# Patient Record
Sex: FEMALE | Race: ASIAN | NOT HISPANIC OR LATINO
[De-identification: names, ages, dates, MRNs, and addresses within clinical notes are randomized per-mention and may not be internally consistent; named-entity substitution may affect disease eponyms.]

---

## 2025-01-17 PROBLEM — Z00.00 ENCOUNTER FOR PREVENTIVE HEALTH EXAMINATION: Status: ACTIVE | Noted: 2025-01-17

## 2025-01-24 ENCOUNTER — APPOINTMENT (OUTPATIENT)
Dept: COLORECTAL SURGERY | Facility: CLINIC | Age: 60
End: 2025-01-24
Payer: COMMERCIAL

## 2025-01-24 ENCOUNTER — NON-APPOINTMENT (OUTPATIENT)
Age: 60
End: 2025-01-24

## 2025-01-24 VITALS
SYSTOLIC BLOOD PRESSURE: 143 MMHG | TEMPERATURE: 97.9 F | DIASTOLIC BLOOD PRESSURE: 82 MMHG | HEIGHT: 64 IN | BODY MASS INDEX: 19.63 KG/M2 | WEIGHT: 115 LBS | HEART RATE: 73 BPM

## 2025-01-24 DIAGNOSIS — K63.89 OTHER SPECIFIED DISEASES OF INTESTINE: ICD-10-CM

## 2025-01-24 DIAGNOSIS — Z86.39 PERSONAL HISTORY OF OTHER ENDOCRINE, NUTRITIONAL AND METABOLIC DISEASE: ICD-10-CM

## 2025-01-24 DIAGNOSIS — K86.89 OTHER SPECIFIED DISEASES OF PANCREAS: ICD-10-CM

## 2025-01-24 PROCEDURE — 99204 OFFICE O/P NEW MOD 45 MIN: CPT

## 2025-02-19 ENCOUNTER — APPOINTMENT (OUTPATIENT)
Dept: COLORECTAL SURGERY | Facility: CLINIC | Age: 60
End: 2025-02-19
Payer: COMMERCIAL

## 2025-02-19 VITALS
HEART RATE: 73 BPM | DIASTOLIC BLOOD PRESSURE: 89 MMHG | TEMPERATURE: 97.52 F | SYSTOLIC BLOOD PRESSURE: 156 MMHG | HEIGHT: 64 IN | WEIGHT: 115 LBS | BODY MASS INDEX: 19.63 KG/M2

## 2025-02-19 DIAGNOSIS — K63.89 OTHER SPECIFIED DISEASES OF INTESTINE: ICD-10-CM

## 2025-02-19 PROCEDURE — 99215 OFFICE O/P EST HI 40 MIN: CPT

## 2025-02-19 RX ORDER — METRONIDAZOLE 500 MG/1
500 TABLET ORAL
Qty: 6 | Refills: 0 | Status: ACTIVE | COMMUNITY
Start: 2025-02-19 | End: 1900-01-01

## 2025-02-19 RX ORDER — POLYETHYLENE GLYCOL 3350 17 G/17G
17 POWDER, FOR SOLUTION ORAL
Qty: 2 | Refills: 0 | Status: ACTIVE | COMMUNITY
Start: 2025-02-19 | End: 1900-01-01

## 2025-02-19 RX ORDER — CIPROFLOXACIN HYDROCHLORIDE 500 MG/1
500 TABLET, FILM COATED ORAL
Qty: 2 | Refills: 0 | Status: ACTIVE | COMMUNITY
Start: 2025-02-19 | End: 1900-01-01

## 2025-02-24 ENCOUNTER — APPOINTMENT (OUTPATIENT)
Dept: SURGICAL ONCOLOGY | Facility: CLINIC | Age: 60
End: 2025-02-24
Payer: COMMERCIAL

## 2025-02-24 VITALS
RESPIRATION RATE: 16 BRPM | SYSTOLIC BLOOD PRESSURE: 124 MMHG | DIASTOLIC BLOOD PRESSURE: 72 MMHG | TEMPERATURE: 209.66 F | BODY MASS INDEX: 19.29 KG/M2 | HEART RATE: 82 BPM | HEIGHT: 64 IN | OXYGEN SATURATION: 97 % | WEIGHT: 113 LBS

## 2025-02-24 DIAGNOSIS — Z86.39 PERSONAL HISTORY OF OTHER ENDOCRINE, NUTRITIONAL AND METABOLIC DISEASE: ICD-10-CM

## 2025-02-24 DIAGNOSIS — Z01.818 ENCOUNTER FOR OTHER PREPROCEDURAL EXAMINATION: ICD-10-CM

## 2025-02-24 DIAGNOSIS — K86.89 OTHER SPECIFIED DISEASES OF PANCREAS: ICD-10-CM

## 2025-02-24 LAB
ANION GAP SERPL CALC-SCNC: 12 MMOL/L
APTT BLD: 30.6 SEC
BUN SERPL-MCNC: 17 MG/DL
CALCIUM SERPL-MCNC: 9.1 MG/DL
CHLORIDE SERPL-SCNC: 103 MMOL/L
CO2 SERPL-SCNC: 26 MMOL/L
CREAT SERPL-MCNC: 0.57 MG/DL
EGFR: 105 ML/MIN/1.73M2
GLUCOSE SERPL-MCNC: 131 MG/DL
INR PPP: 0.98 RATIO
POTASSIUM SERPL-SCNC: 3.6 MMOL/L
PT BLD: 11.6 SEC
SODIUM SERPL-SCNC: 141 MMOL/L

## 2025-02-24 PROCEDURE — 99204 OFFICE O/P NEW MOD 45 MIN: CPT

## 2025-02-25 LAB
ABORH: NORMAL
BASOPHILS # BLD AUTO: 0.02 K/UL
BASOPHILS NFR BLD AUTO: 0.4 %
EOSINOPHIL # BLD AUTO: 0.02 K/UL
EOSINOPHIL NFR BLD AUTO: 0.4 %
HCT VFR BLD CALC: 39.6 %
HGB BLD-MCNC: 11.5 G/DL
IMM GRANULOCYTES NFR BLD AUTO: 0 %
LYMPHOCYTES # BLD AUTO: 0.83 K/UL
LYMPHOCYTES NFR BLD AUTO: 18.5 %
MAN DIFF?: NORMAL
MCHC RBC-ENTMCNC: 22.7 PG
MCHC RBC-ENTMCNC: 29 G/DL
MCV RBC AUTO: 78.1 FL
MONOCYTES # BLD AUTO: 0.48 K/UL
MONOCYTES NFR BLD AUTO: 10.7 %
NEUTROPHILS # BLD AUTO: 3.14 K/UL
NEUTROPHILS NFR BLD AUTO: 70 %
PLATELET # BLD AUTO: 289 K/UL
RBC # BLD: 5.07 M/UL
RBC # FLD: 14.5 %
WBC # FLD AUTO: 4.49 K/UL

## 2025-02-27 ENCOUNTER — OUTPATIENT (OUTPATIENT)
Dept: OUTPATIENT SERVICES | Facility: HOSPITAL | Age: 60
LOS: 1 days | End: 2025-02-27
Payer: COMMERCIAL

## 2025-02-27 DIAGNOSIS — K63.89 OTHER SPECIFIED DISEASES OF INTESTINE: ICD-10-CM

## 2025-02-27 PROCEDURE — 88342 IMHCHEM/IMCYTCHM 1ST ANTB: CPT | Mod: 26,59

## 2025-02-27 PROCEDURE — 88321 CONSLTJ&REPRT SLD PREP ELSWR: CPT

## 2025-02-27 PROCEDURE — 88342 IMHCHEM/IMCYTCHM 1ST ANTB: CPT

## 2025-02-27 PROCEDURE — 88341 IMHCHEM/IMCYTCHM EA ADD ANTB: CPT | Mod: 26

## 2025-02-27 PROCEDURE — 88341 IMHCHEM/IMCYTCHM EA ADD ANTB: CPT

## 2025-03-06 ENCOUNTER — OUTPATIENT (OUTPATIENT)
Dept: OUTPATIENT SERVICES | Facility: HOSPITAL | Age: 60
LOS: 1 days | End: 2025-03-06
Payer: COMMERCIAL

## 2025-03-06 ENCOUNTER — RESULT REVIEW (OUTPATIENT)
Age: 60
End: 2025-03-06

## 2025-03-06 DIAGNOSIS — C18.9 MALIGNANT NEOPLASM OF COLON, UNSPECIFIED: ICD-10-CM

## 2025-03-06 PROCEDURE — 88321 CONSLTJ&REPRT SLD PREP ELSWR: CPT

## 2025-03-10 ENCOUNTER — NON-APPOINTMENT (OUTPATIENT)
Age: 60
End: 2025-03-10

## 2025-03-10 ENCOUNTER — OUTPATIENT (OUTPATIENT)
Dept: OUTPATIENT SERVICES | Facility: HOSPITAL | Age: 60
LOS: 1 days | End: 2025-03-10
Payer: COMMERCIAL

## 2025-03-10 ENCOUNTER — APPOINTMENT (OUTPATIENT)
Dept: NUCLEAR MEDICINE | Facility: HOSPITAL | Age: 60
End: 2025-03-10

## 2025-03-10 ENCOUNTER — RESULT REVIEW (OUTPATIENT)
Age: 60
End: 2025-03-10

## 2025-03-10 PROCEDURE — 82962 GLUCOSE BLOOD TEST: CPT

## 2025-03-10 PROCEDURE — A9587: CPT

## 2025-03-10 PROCEDURE — 78816 PET IMAGE W/CT FULL BODY: CPT

## 2025-03-10 PROCEDURE — 78816 PET IMAGE W/CT FULL BODY: CPT | Mod: 26,PI

## 2025-03-25 VITALS
HEIGHT: 64 IN | RESPIRATION RATE: 17 BRPM | SYSTOLIC BLOOD PRESSURE: 152 MMHG | OXYGEN SATURATION: 98 % | WEIGHT: 110.67 LBS | DIASTOLIC BLOOD PRESSURE: 82 MMHG | HEART RATE: 91 BPM | TEMPERATURE: 98 F

## 2025-03-25 RX ORDER — ERGOCALCIFEROL 1.25 MG/1
1 CAPSULE ORAL
Refills: 0 | DISCHARGE

## 2025-03-25 NOTE — PATIENT PROFILE ADULT - FALL HARM RISK - HARM RISK INTERVENTIONS

## 2025-03-25 NOTE — PATIENT PROFILE ADULT - FUNCTIONAL ASSESSMENT - DAILY ACTIVITY 1.
Amanda Olivera from lab notified that blood cultures need to be drawn stat. 4 = No assist / stand by assistance

## 2025-03-26 ENCOUNTER — APPOINTMENT (OUTPATIENT)
Dept: COLORECTAL SURGERY | Facility: HOSPITAL | Age: 60
End: 2025-03-26

## 2025-03-26 ENCOUNTER — APPOINTMENT (OUTPATIENT)
Dept: SURGICAL ONCOLOGY | Facility: HOSPITAL | Age: 60
End: 2025-03-26

## 2025-03-26 ENCOUNTER — TRANSCRIPTION ENCOUNTER (OUTPATIENT)
Age: 60
End: 2025-03-26

## 2025-03-26 ENCOUNTER — INPATIENT (INPATIENT)
Facility: HOSPITAL | Age: 60
LOS: 11 days | Discharge: HOME HEALTH | End: 2025-04-07
Attending: SURGERY | Admitting: SURGERY
Payer: COMMERCIAL

## 2025-03-26 DIAGNOSIS — Z98.890 OTHER SPECIFIED POSTPROCEDURAL STATES: Chronic | ICD-10-CM

## 2025-03-26 DIAGNOSIS — Z88.0 ALLERGY STATUS TO PENICILLIN: ICD-10-CM

## 2025-03-26 DIAGNOSIS — C7B.8 OTHER SECONDARY NEUROENDOCRINE TUMORS: ICD-10-CM

## 2025-03-26 DIAGNOSIS — K80.20 CALCULUS OF GALLBLADDER WITHOUT CHOLECYSTITIS WITHOUT OBSTRUCTION: ICD-10-CM

## 2025-03-26 DIAGNOSIS — K63.0 ABSCESS OF INTESTINE: ICD-10-CM

## 2025-03-26 DIAGNOSIS — E83.39 OTHER DISORDERS OF PHOSPHORUS METABOLISM: ICD-10-CM

## 2025-03-26 DIAGNOSIS — I10 ESSENTIAL (PRIMARY) HYPERTENSION: ICD-10-CM

## 2025-03-26 DIAGNOSIS — K63.5 POLYP OF COLON: ICD-10-CM

## 2025-03-26 DIAGNOSIS — D3A.8 OTHER BENIGN NEUROENDOCRINE TUMORS: ICD-10-CM

## 2025-03-26 DIAGNOSIS — C7A.1 MALIGNANT POORLY DIFFERENTIATED NEUROENDOCRINE TUMORS: ICD-10-CM

## 2025-03-26 DIAGNOSIS — E44.1 MILD PROTEIN-CALORIE MALNUTRITION: ICD-10-CM

## 2025-03-26 LAB
ALBUMIN SERPL ELPH-MCNC: 3.3 G/DL — SIGNIFICANT CHANGE UP (ref 3.3–5)
ALP SERPL-CCNC: 68 U/L — SIGNIFICANT CHANGE UP (ref 40–120)
ALT FLD-CCNC: 31 U/L — SIGNIFICANT CHANGE UP (ref 10–45)
ANION GAP SERPL CALC-SCNC: 15 MMOL/L — SIGNIFICANT CHANGE UP (ref 5–17)
AST SERPL-CCNC: 84 U/L — HIGH (ref 10–40)
BILIRUB SERPL-MCNC: 1.3 MG/DL — HIGH (ref 0.2–1.2)
BLD GP AB SCN SERPL QL: NEGATIVE — SIGNIFICANT CHANGE UP
BUN SERPL-MCNC: 12 MG/DL — SIGNIFICANT CHANGE UP (ref 7–23)
CALCIUM SERPL-MCNC: 8.2 MG/DL — LOW (ref 8.4–10.5)
CHLORIDE SERPL-SCNC: 106 MMOL/L — SIGNIFICANT CHANGE UP (ref 96–108)
CO2 SERPL-SCNC: 20 MMOL/L — LOW (ref 22–31)
CREAT SERPL-MCNC: 0.78 MG/DL — SIGNIFICANT CHANGE UP (ref 0.5–1.3)
EGFR: 87 ML/MIN/1.73M2 — SIGNIFICANT CHANGE UP
EGFR: 87 ML/MIN/1.73M2 — SIGNIFICANT CHANGE UP
GLUCOSE SERPL-MCNC: 129 MG/DL — HIGH (ref 70–99)
HCT VFR BLD CALC: 34.9 % — SIGNIFICANT CHANGE UP (ref 34.5–45)
HGB BLD-MCNC: 10.7 G/DL — LOW (ref 11.5–15.5)
INR BLD: 1.25 — HIGH (ref 0.85–1.16)
MCHC RBC-ENTMCNC: 22.9 PG — LOW (ref 27–34)
MCHC RBC-ENTMCNC: 30.7 G/DL — LOW (ref 32–36)
MCV RBC AUTO: 74.7 FL — LOW (ref 80–100)
NRBC BLD AUTO-RTO: 0 /100 WBCS — SIGNIFICANT CHANGE UP (ref 0–0)
PLATELET # BLD AUTO: 216 K/UL — SIGNIFICANT CHANGE UP (ref 150–400)
POTASSIUM SERPL-MCNC: 3.2 MMOL/L — LOW (ref 3.5–5.3)
POTASSIUM SERPL-SCNC: 3.2 MMOL/L — LOW (ref 3.5–5.3)
PROT SERPL-MCNC: 5.6 G/DL — LOW (ref 6–8.3)
PROTHROM AB SERPL-ACNC: 14.6 SEC — HIGH (ref 9.9–13.4)
RBC # BLD: 4.67 M/UL — SIGNIFICANT CHANGE UP (ref 3.8–5.2)
RBC # FLD: 13.8 % — SIGNIFICANT CHANGE UP (ref 10.3–14.5)
RH IG SCN BLD-IMP: POSITIVE — SIGNIFICANT CHANGE UP
SODIUM SERPL-SCNC: 141 MMOL/L — SIGNIFICANT CHANGE UP (ref 135–145)
WBC # BLD: 12.6 K/UL — HIGH (ref 3.8–10.5)
WBC # FLD AUTO: 12.6 K/UL — HIGH (ref 3.8–10.5)

## 2025-03-26 PROCEDURE — 48153 PANCREATECTOMY: CPT

## 2025-03-26 PROCEDURE — 88309 TISSUE EXAM BY PATHOLOGIST: CPT | Mod: 26

## 2025-03-26 PROCEDURE — 88341 IMHCHEM/IMCYTCHM EA ADD ANTB: CPT | Mod: 26

## 2025-03-26 PROCEDURE — 88304 TISSUE EXAM BY PATHOLOGIST: CPT | Mod: 26

## 2025-03-26 PROCEDURE — 88342 IMHCHEM/IMCYTCHM 1ST ANTB: CPT | Mod: 26

## 2025-03-26 PROCEDURE — 88305 TISSUE EXAM BY PATHOLOGIST: CPT | Mod: 26

## 2025-03-26 PROCEDURE — 44160 REMOVAL OF COLON: CPT | Mod: GC

## 2025-03-26 PROCEDURE — 71045 X-RAY EXAM CHEST 1 VIEW: CPT | Mod: 26

## 2025-03-26 DEVICE — CLIP APPLIER ETHICON LIGACLIP 11.5" MEDIUM
Type: IMPLANTABLE DEVICE | Status: NON-FUNCTIONAL
Removed: 2025-03-26

## 2025-03-26 DEVICE — STAPLER COVIDIEN TRI-STAPLE 60MM PURPLE RELOAD
Type: IMPLANTABLE DEVICE | Status: NON-FUNCTIONAL
Removed: 2025-03-26

## 2025-03-26 DEVICE — LIGATING CLIPS WECK HORIZON MEDIUM (BLUE) 24
Type: IMPLANTABLE DEVICE | Status: NON-FUNCTIONAL
Removed: 2025-03-26

## 2025-03-26 DEVICE — SURGICEL FIBRILLAR 4 X 4"
Type: IMPLANTABLE DEVICE | Status: NON-FUNCTIONAL
Removed: 2025-03-26

## 2025-03-26 DEVICE — STAPLER ETHICON PROXIMATE 75MM WITH BLUE RELOAD
Type: IMPLANTABLE DEVICE | Status: NON-FUNCTIONAL
Removed: 2025-03-26

## 2025-03-26 DEVICE — SURGCEL 4 X 8"
Type: IMPLANTABLE DEVICE | Status: NON-FUNCTIONAL
Removed: 2025-03-26

## 2025-03-26 DEVICE — LIGATING CLIPS WECK HORIZON LARGE (ORANGE) 24
Type: IMPLANTABLE DEVICE | Status: NON-FUNCTIONAL
Removed: 2025-03-26

## 2025-03-26 RX ORDER — HEPARIN SODIUM 1000 [USP'U]/ML
5000 INJECTION INTRAVENOUS; SUBCUTANEOUS EVERY 8 HOURS
Refills: 0 | Status: DISCONTINUED | OUTPATIENT
Start: 2025-03-26 | End: 2025-04-07

## 2025-03-26 RX ORDER — BUPIVACAINE 13.3 MG/ML
20 INJECTION, SUSPENSION, LIPOSOMAL INFILTRATION ONCE
Refills: 0 | Status: DISCONTINUED | OUTPATIENT
Start: 2025-03-26 | End: 2025-03-27

## 2025-03-26 RX ORDER — NALOXONE HYDROCHLORIDE 0.4 MG/ML
0.1 INJECTION, SOLUTION INTRAMUSCULAR; INTRAVENOUS; SUBCUTANEOUS
Refills: 0 | Status: DISCONTINUED | OUTPATIENT
Start: 2025-03-26 | End: 2025-04-07

## 2025-03-26 RX ORDER — HEPARIN SODIUM 1000 [USP'U]/ML
5000 INJECTION INTRAVENOUS; SUBCUTANEOUS ONCE
Refills: 0 | Status: COMPLETED | OUTPATIENT
Start: 2025-03-26 | End: 2025-03-26

## 2025-03-26 RX ORDER — ONDANSETRON HCL/PF 4 MG/2 ML
4 VIAL (ML) INJECTION EVERY 6 HOURS
Refills: 0 | Status: DISCONTINUED | OUTPATIENT
Start: 2025-03-26 | End: 2025-04-07

## 2025-03-26 RX ORDER — SODIUM CHLORIDE 9 G/1000ML
1000 INJECTION, SOLUTION INTRAVENOUS ONCE
Refills: 0 | Status: COMPLETED | OUTPATIENT
Start: 2025-03-26 | End: 2025-03-26

## 2025-03-26 RX ORDER — HYDROMORPHONE/SOD CHLOR,ISO/PF 2 MG/10 ML
30 SYRINGE (ML) INJECTION
Refills: 0 | Status: DISCONTINUED | OUTPATIENT
Start: 2025-03-26 | End: 2025-03-31

## 2025-03-26 RX ORDER — SODIUM CHLORIDE 9 G/1000ML
1000 INJECTION, SOLUTION INTRAVENOUS
Refills: 0 | Status: DISCONTINUED | OUTPATIENT
Start: 2025-03-26 | End: 2025-03-28

## 2025-03-26 RX ORDER — CELECOXIB 50 MG/1
200 CAPSULE ORAL ONCE
Refills: 0 | Status: COMPLETED | OUTPATIENT
Start: 2025-03-26 | End: 2025-03-26

## 2025-03-26 RX ORDER — ACETAMINOPHEN 500 MG/5ML
1000 LIQUID (ML) ORAL EVERY 6 HOURS
Refills: 0 | Status: COMPLETED | OUTPATIENT
Start: 2025-03-26 | End: 2025-03-27

## 2025-03-26 RX ORDER — SCOPOLAMINE 1 MG/3D
1 PATCH, EXTENDED RELEASE TRANSDERMAL ONCE
Refills: 0 | Status: COMPLETED | OUTPATIENT
Start: 2025-03-26 | End: 2025-03-26

## 2025-03-26 RX ORDER — SODIUM CHLORIDE 9 G/1000ML
500 INJECTION, SOLUTION INTRAVENOUS ONCE
Refills: 0 | Status: DISCONTINUED | OUTPATIENT
Start: 2025-03-26 | End: 2025-03-26

## 2025-03-26 RX ADMIN — Medication 30 MILLILITER(S): at 17:36

## 2025-03-26 RX ADMIN — CELECOXIB 200 MILLIGRAM(S): 50 CAPSULE ORAL at 07:34

## 2025-03-26 RX ADMIN — Medication 100 MILLIEQUIVALENT(S): at 23:53

## 2025-03-26 RX ADMIN — Medication 400 MILLIGRAM(S): at 18:35

## 2025-03-26 RX ADMIN — Medication 100 MILLIEQUIVALENT(S): at 19:51

## 2025-03-26 RX ADMIN — SODIUM CHLORIDE 150 MILLILITER(S): 9 INJECTION, SOLUTION INTRAVENOUS at 17:10

## 2025-03-26 RX ADMIN — HEPARIN SODIUM 5000 UNIT(S): 1000 INJECTION INTRAVENOUS; SUBCUTANEOUS at 07:35

## 2025-03-26 RX ADMIN — SODIUM CHLORIDE 1000 MILLILITER(S): 9 INJECTION, SOLUTION INTRAVENOUS at 20:10

## 2025-03-26 RX ADMIN — Medication 100 MILLIEQUIVALENT(S): at 21:03

## 2025-03-26 RX ADMIN — Medication 100 MILLIEQUIVALENT(S): at 22:00

## 2025-03-26 RX ADMIN — HEPARIN SODIUM 5000 UNIT(S): 1000 INJECTION INTRAVENOUS; SUBCUTANEOUS at 23:11

## 2025-03-26 RX ADMIN — SCOPOLAMINE 1 PATCH: 1 PATCH, EXTENDED RELEASE TRANSDERMAL at 07:35

## 2025-03-26 RX ADMIN — SODIUM CHLORIDE 150 MILLILITER(S): 9 INJECTION, SOLUTION INTRAVENOUS at 23:50

## 2025-03-26 RX ADMIN — Medication 400 MILLIGRAM(S): at 23:56

## 2025-03-26 NOTE — BRIEF OPERATIVE NOTE - OPERATION/FINDINGS
Upper midline laparotomy. Peritoneal cavity entered and falciform ligament taken down with electrosurgery. Hepatic flexure mobilization completed in lateral to medial fashion. Lesser sac then entered outside epiploic arcade and extended laterally. Kocher maneuver then completed with exposure of the left renal vein. SMV identified and infrapancreatic dissection completed. Stay sutures placed across pancreatic neck at level of planned transection. Common hepatic artery lymph node then identified and sent as specimen. OLGA then traced to GDA which was ligated with 3.0 silk ties and clips after confirmed OLGA with palpable pulse. Mobilization of distal stomach completed and right gastric artery ligated with ties and harmonic. D1 segment of duodenum then divided with endogia stapler purple load for pylorus sparing procedure. Attention turned back to portal dissection. Open cholecystectomy peformed in fundus first fashion. Cystic artery and duct taken with ties and clips. Common bile duct divided sharply just distal to cystic duct. Jejunal dissection then completed and divided approximately 20cm distal to LoT with endogia stapler purple load. Mesentry taken with harmonic past LoT and jejunum lateralized. Pancreas then divided sharply with 10blade. Pancreatic duct identified and stented with 5Fr stent. Specimen then taken off SMV/SMA and removed. Right hemicolectomy then performed with lateral to medial dissection. Right branch of middle colic and ileocolic arteries ligated high with 2.0 vicryl sutures and ties. Transverse colon and TI transected with endogia stapler purple loads. Side to side anastomosis then completed with Endogia stapler purple load 75mm. Staple line examined with no evidence of bleeding. Common enterotomy closed with neda stapler blue load x1. PJ completed over 5Fr stent in 2 layers with 3.0 silk and 5.0 PDS sutures. HJ completed in single layer with 4.0 PDS sutures. No evidence of bile leak visualized after anastomosis. GJ then completed in 2 layers.

## 2025-03-26 NOTE — H&P ADULT - ASSESSMENT
58yo F with PMH of HTN and no significant PSx found to have a 3cm polypoid lesion of the appendiceal orifice on screening colonoscopy with 3cm pancreatic head cyst concerning for neuroendocrine tumor on f/u CT scan. Subsequent EGD/EUS confirmed neuroendocrine pathology.     Plan:  Proceed to OR

## 2025-03-26 NOTE — BRIEF OPERATIVE NOTE - NSICDXBRIEFPROCEDURE_GEN_ALL_CORE_FT
PROCEDURES:  Diagnostic laparoscopy with Whipple procedure 26-Mar-2025 16:35:53  Ranjith Oshea  Open right hemicolectomy 26-Mar-2025 16:36:17  Ranjith Oshea

## 2025-03-26 NOTE — PROGRESS NOTE ADULT - SUBJECTIVE AND OBJECTIVE BOX
Procedure: Open whipple, open hemicolectomy  Surgeon: Dr. Shipley    S: Pt resting comfortably in bed, son by bedside. Patient states she has some epigastric pain and a dry throat. Denies CP, SOB, SANCHEZ, calf tenderness, nausea, vomiting. Pain controlled with medication.    O:  T(C): --  T(F): --  HR: 87 (03-26-25 @ 17:15) (87 - 92)  BP: 145/79 (03-26-25 @ 17:15) (133/76 - 145/79)  RR: 15 (03-26-25 @ 17:15) (13 - 16)  SpO2: 100% (03-26-25 @ 17:15) (100% - 100%)  Wt(kg): --                        10.7   12.60 )-----------( 216      ( 26 Mar 2025 17:24 )             34.9     03-26    141  |  106  |  12  ----------------------------<  129[H]  3.2[L]   |  20[L]  |  0.78    Ca    8.2[L]      26 Mar 2025 17:24    TPro  5.6[L]  /  Alb  3.3  /  TBili  1.3[H]  /  DBili  x   /  AST  84[H]  /  ALT  31  /  AlkPhos  68  03-26      Gen: NAD, resting comfortably in bed  HEENT: NGT to LCWS with dark tinged, bilious output.  C/V: NSR  Pulm: Nonlabored breathing, no respiratory distress  Abd: soft, mild epigastric ttp, nondistended. SEAN x 2 with ss output. Midline dressing with minimal strikethrough.  : Subramanian Catheter in place with clear, yellow urine  Extrem: WWP, SCDs in place

## 2025-03-26 NOTE — PROGRESS NOTE ADULT - ASSESSMENT
58yo F with PMH of HTN and no significant PSx found to have a 3cm polypoid lesion of the appendiceal orifice on screening colonoscopy with 3cm pancreatic head cyst concerning for neuroendocrine tumor on f/u CT scan. Subsequent EGD/EUS confirmed neuroendocrine pathology. Now s/p open whipple and right hemicolectomy on 3/26.    Telemetry  Whipple Pathway  Dilaudid PCA/IV Tylenol/Zofran  NGT to LCWS  Subramanian  SEAN drain x2  Protonix 40mg IV qd  HSQ/SCDs/IS/OOBA  AM labs

## 2025-03-26 NOTE — PRE-ANESTHESIA EVALUATION ADULT - NSANTHTOTALSCORECAL_ENT_A_CORE
(+) large pericardial effusion with signs of early cardiac tamponade. S/P IR drainage of pericardial effusion. Improved based on TTE 5/12  s/p Bleomycin pericardial sclerosis 5/13, pt tolerated procedure well   Tele monitoring, reviewed   s/p removal of pericardial drain   -f/u with Cardiology / CTS as outpatient 2

## 2025-03-26 NOTE — H&P ADULT - HISTORY OF PRESENT ILLNESS
58yo F with PMH of HTN and no significant PSx found to have a 3cm polypoid lesion of the appendiceal orifice on screening colonoscopy with 3cm pancreatic head cyst concerning for neuroendocrine tumor on f/u CT scan. Subsequent EGD/EUS confirmed neuroendocrine pathology.     PMH: HTN  PSx: Excision of dermoid cyst    Meds: Drisol (vitamin D) 50,000U qweekly

## 2025-03-26 NOTE — PRE-ANESTHESIA EVALUATION ADULT - NSANTHPMHFT_GEN_ALL_CORE
58yo [place]ish[place] speaking F with hx of HTN, newly diagnosed mass in the head of pancreas presents for open Whipple procedure. She denies cardiac or pulmonary hx or symptoms, able to perform >4mets. She has only mild discomfort and some nausea this morning.

## 2025-03-27 LAB
ALBUMIN SERPL ELPH-MCNC: 3.1 G/DL — LOW (ref 3.3–5)
ALP SERPL-CCNC: 59 U/L — SIGNIFICANT CHANGE UP (ref 40–120)
ALT FLD-CCNC: 48 U/L — HIGH (ref 10–45)
ANION GAP SERPL CALC-SCNC: 8 MMOL/L — SIGNIFICANT CHANGE UP (ref 5–17)
APTT BLD: 32.8 SEC — SIGNIFICANT CHANGE UP (ref 24.5–35.6)
AST SERPL-CCNC: 72 U/L — HIGH (ref 10–40)
BILIRUB SERPL-MCNC: 0.5 MG/DL — SIGNIFICANT CHANGE UP (ref 0.2–1.2)
BUN SERPL-MCNC: 13 MG/DL — SIGNIFICANT CHANGE UP (ref 7–23)
CALCIUM SERPL-MCNC: 7.9 MG/DL — LOW (ref 8.4–10.5)
CHLORIDE SERPL-SCNC: 106 MMOL/L — SIGNIFICANT CHANGE UP (ref 96–108)
CO2 SERPL-SCNC: 26 MMOL/L — SIGNIFICANT CHANGE UP (ref 22–31)
CREAT SERPL-MCNC: 0.7 MG/DL — SIGNIFICANT CHANGE UP (ref 0.5–1.3)
EGFR: 100 ML/MIN/1.73M2 — SIGNIFICANT CHANGE UP
EGFR: 100 ML/MIN/1.73M2 — SIGNIFICANT CHANGE UP
GLUCOSE SERPL-MCNC: 114 MG/DL — HIGH (ref 70–99)
HCT VFR BLD CALC: 30.9 % — LOW (ref 34.5–45)
HGB BLD-MCNC: 9.4 G/DL — LOW (ref 11.5–15.5)
INR BLD: 1.39 — HIGH (ref 0.85–1.16)
MAGNESIUM SERPL-MCNC: 1.8 MG/DL — SIGNIFICANT CHANGE UP (ref 1.6–2.6)
MCHC RBC-ENTMCNC: 23.3 PG — LOW (ref 27–34)
MCHC RBC-ENTMCNC: 30.4 G/DL — LOW (ref 32–36)
MCV RBC AUTO: 76.7 FL — LOW (ref 80–100)
NRBC BLD AUTO-RTO: 0 /100 WBCS — SIGNIFICANT CHANGE UP (ref 0–0)
PHOSPHATE SERPL-MCNC: 3.9 MG/DL — SIGNIFICANT CHANGE UP (ref 2.5–4.5)
PLATELET # BLD AUTO: 193 K/UL — SIGNIFICANT CHANGE UP (ref 150–400)
POTASSIUM SERPL-MCNC: 3.7 MMOL/L — SIGNIFICANT CHANGE UP (ref 3.5–5.3)
POTASSIUM SERPL-SCNC: 3.7 MMOL/L — SIGNIFICANT CHANGE UP (ref 3.5–5.3)
PROT SERPL-MCNC: 5.1 G/DL — LOW (ref 6–8.3)
PROTHROM AB SERPL-ACNC: 16.2 SEC — HIGH (ref 9.9–13.4)
RBC # BLD: 4.03 M/UL — SIGNIFICANT CHANGE UP (ref 3.8–5.2)
RBC # FLD: 13.9 % — SIGNIFICANT CHANGE UP (ref 10.3–14.5)
SODIUM SERPL-SCNC: 140 MMOL/L — SIGNIFICANT CHANGE UP (ref 135–145)
WBC # BLD: 10.55 K/UL — HIGH (ref 3.8–10.5)
WBC # FLD AUTO: 10.55 K/UL — HIGH (ref 3.8–10.5)

## 2025-03-27 RX ORDER — KETOROLAC TROMETHAMINE 30 MG/ML
15 INJECTION, SOLUTION INTRAMUSCULAR; INTRAVENOUS EVERY 6 HOURS
Refills: 0 | Status: DISCONTINUED | OUTPATIENT
Start: 2025-03-27 | End: 2025-03-29

## 2025-03-27 RX ORDER — MAGNESIUM SULFATE 500 MG/ML
1 SYRINGE (ML) INJECTION ONCE
Refills: 0 | Status: COMPLETED | OUTPATIENT
Start: 2025-03-27 | End: 2025-03-27

## 2025-03-27 RX ADMIN — Medication 1000 MILLIGRAM(S): at 06:31

## 2025-03-27 RX ADMIN — Medication 100 GRAM(S): at 09:47

## 2025-03-27 RX ADMIN — Medication 1000 MILLIGRAM(S): at 12:45

## 2025-03-27 RX ADMIN — Medication 400 MILLIGRAM(S): at 05:44

## 2025-03-27 RX ADMIN — Medication 40 MILLIGRAM(S): at 12:01

## 2025-03-27 RX ADMIN — SCOPOLAMINE 1 PATCH: 1 PATCH, EXTENDED RELEASE TRANSDERMAL at 19:49

## 2025-03-27 RX ADMIN — Medication 400 MILLIGRAM(S): at 12:01

## 2025-03-27 RX ADMIN — HEPARIN SODIUM 5000 UNIT(S): 1000 INJECTION INTRAVENOUS; SUBCUTANEOUS at 06:45

## 2025-03-27 RX ADMIN — Medication 100 MILLIEQUIVALENT(S): at 10:58

## 2025-03-27 RX ADMIN — SCOPOLAMINE 1 PATCH: 1 PATCH, EXTENDED RELEASE TRANSDERMAL at 06:57

## 2025-03-27 RX ADMIN — HEPARIN SODIUM 5000 UNIT(S): 1000 INJECTION INTRAVENOUS; SUBCUTANEOUS at 15:45

## 2025-03-27 RX ADMIN — HEPARIN SODIUM 5000 UNIT(S): 1000 INJECTION INTRAVENOUS; SUBCUTANEOUS at 23:00

## 2025-03-27 RX ADMIN — Medication 100 MILLIEQUIVALENT(S): at 09:49

## 2025-03-27 RX ADMIN — Medication 100 MILLIEQUIVALENT(S): at 12:57

## 2025-03-27 RX ADMIN — Medication 1000 MILLIGRAM(S): at 01:17

## 2025-03-27 NOTE — DISCHARGE NOTE PROVIDER - NSDCFUADDAPPT_GEN_ALL_CORE_FT
Please schedule a follow-up appointment with Dr. Shipley within 1-2 weeks of discharge from the hospital. You may reach his office at [phone] at your earliest convenience.

## 2025-03-27 NOTE — DISCHARGE NOTE PROVIDER - NSDCCPTREATMENT_GEN_ALL_CORE_FT
PRINCIPAL PROCEDURE  Procedure: Diagnostic laparoscopy with Whipple procedure  Findings and Treatment:       SECONDARY PROCEDURE  Procedure: Open right hemicolectomy  Findings and Treatment:

## 2025-03-27 NOTE — PROGRESS NOTE ADULT - SUBJECTIVE AND OBJECTIVE BOX
INTERVAL HPI/OVERNIGHT EVENTS: uo: 100 cc in pacu, 5 cc at 7pm, 1L bolus given, uo: 285 cc at mn. NGT flushed with 40 cc, output at MN: 50, bilious. SEAN x 2 SS. -OOBC. IVF continued at 150 cc/hr. UO: 735 cc    STATUS POST: open whipple and right hemicolectomy    POST OPERATIVE DAY #: 1    SUBJECTIVE: Patient seen and examined at bedside with chief resident. Patient denies flatus or bowel movement. Patient reports some pain but controlled with medications. Patient denies nausea or vomiting.     MEDICATIONS  (STANDING):  acetaminophen   IVPB .. 1000 milliGRAM(s) IV Intermittent every 6 hours  BUpivacaine liposome 1.3% Injectable (no eMAR) 20 milliLiter(s) Local Injection once  heparin   Injectable 5000 Unit(s) SubCutaneous every 8 hours  HYDROmorphone PCA (1 mG/mL) 30 milliLiter(s) PCA Continuous PCA Continuous  lactated ringers. 1000 milliLiter(s) (150 mL/Hr) IV Continuous <Continuous>  pantoprazole  Injectable 40 milliGRAM(s) IV Push daily    MEDICATIONS  (PRN):  ketorolac   Injectable 15 milliGRAM(s) IV Push every 6 hours PRN Mild Pain (1 - 3)  naloxone Injectable 0.1 milliGRAM(s) IV Push every 3 minutes PRN For ANY of the following changes in patient status:  A. RR LESS THAN 10 breaths per minute, B. Oxygen saturation LESS THAN 90%, C. Sedation score of 6  ondansetron Injectable 4 milliGRAM(s) IV Push every 6 hours PRN Nausea      Vital Signs Last 24 Hrs  T(C): 36.9 (27 Mar 2025 04:51), Max: 37.6 (26 Mar 2025 23:20)  T(F): 98.4 (27 Mar 2025 04:51), Max: 99.7 (26 Mar 2025 23:20)  HR: 82 (27 Mar 2025 04:00) (82 - 95)  BP: 106/56 (27 Mar 2025 04:00) (106/56 - 152/82)  BP(mean): 75 (27 Mar 2025 04:00) (75 - 108)  RR: 16 (27 Mar 2025 04:00) (10 - 25)  SpO2: 97% (27 Mar 2025 04:00) (93% - 100%)    Parameters below as of 27 Mar 2025 04:00  Patient On (Oxygen Delivery Method): room air        PHYSICAL EXAM:      General: NAD, resting comfortably in bed    Respiratory: non labored breathing, no respiratory distress    Cardiovascular: NSR, RRR    Gastrointestinal: soft, nondistended, appropriately TTP at incisions. no rebound or guarding. midline dressing with minimal strikethrough. SEAN x2 with SS output. NGT in place to LIWS with bilious output.    Genitourinary: calderon in place draining clear yellow urine    Extremities: WWP, no edema                  I&O's Detail    26 Mar 2025 07:01  -  27 Mar 2025 07:00  --------------------------------------------------------  IN:    IV PiggyBack: 400 mL    Lactated Ringers: 2100 mL    Lactated Ringers Bolus: 1000 mL  Total IN: 3500 mL    OUT:    Bulb (mL): 155 mL    Bulb (mL): 95 mL    Indwelling Catheter - Urethral (mL): 835 mL    Nasogastric/Oral tube (mL): 50 mL    Oral Fluid: 0 mL  Total OUT: 1135 mL    Total NET: 2365 mL          LABS:                        10.7   12.60 )-----------( 216      ( 26 Mar 2025 17:24 )             34.9     03-26    141  |  106  |  12  ----------------------------<  129[H]  3.2[L]   |  20[L]  |  0.78    Ca    8.2[L]      26 Mar 2025 17:24    TPro  5.6[L]  /  Alb  3.3  /  TBili  1.3[H]  /  DBili  x   /  AST  84[H]  /  ALT  31  /  AlkPhos  68  03-26    PT/INR - ( 26 Mar 2025 17:24 )   PT: 14.6 sec;   INR: 1.25            Urinalysis Basic - ( 26 Mar 2025 17:24 )    Color: x / Appearance: x / SG: x / pH: x  Gluc: 129 mg/dL / Ketone: x  / Bili: x / Urobili: x   Blood: x / Protein: x / Nitrite: x   Leuk Esterase: x / RBC: x / WBC x   Sq Epi: x / Non Sq Epi: x / Bacteria: x        RADIOLOGY & ADDITIONAL STUDIES:

## 2025-03-27 NOTE — PROGRESS NOTE ADULT - SUBJECTIVE AND OBJECTIVE BOX
SUBJECTIVE:      MEDICATIONS  (STANDING):  acetaminophen   IVPB .. 1000 milliGRAM(s) IV Intermittent every 6 hours  BUpivacaine liposome 1.3% Injectable (no eMAR) 20 milliLiter(s) Local Injection once  heparin   Injectable 5000 Unit(s) SubCutaneous every 8 hours  HYDROmorphone PCA (1 mG/mL) 30 milliLiter(s) PCA Continuous PCA Continuous  lactated ringers. 1000 milliLiter(s) (150 mL/Hr) IV Continuous <Continuous>  pantoprazole  Injectable 40 milliGRAM(s) IV Push daily    MEDICATIONS  (PRN):  naloxone Injectable 0.1 milliGRAM(s) IV Push every 3 minutes PRN For ANY of the following changes in patient status:  A. RR LESS THAN 10 breaths per minute, B. Oxygen saturation LESS THAN 90%, C. Sedation score of 6  ondansetron Injectable 4 milliGRAM(s) IV Push every 6 hours PRN Nausea      Vital Signs Last 24 Hrs  T(C): 36.9 (27 Mar 2025 04:51), Max: 37.6 (26 Mar 2025 23:20)  T(F): 98.4 (27 Mar 2025 04:51), Max: 99.7 (26 Mar 2025 23:20)  HR: 82 (27 Mar 2025 04:00) (82 - 95)  BP: 106/56 (27 Mar 2025 04:00) (106/56 - 152/82)  BP(mean): 75 (27 Mar 2025 04:00) (75 - 108)  RR: 16 (27 Mar 2025 04:00) (10 - 25)  SpO2: 97% (27 Mar 2025 04:00) (93% - 100%)    Parameters below as of 27 Mar 2025 04:00  Patient On (Oxygen Delivery Method): room air        PHYSICAL EXAM:      Constitutional: A&Ox3    Breasts:    Respiratory: non labored breathing, no respiratory distress    Cardiovascular: NSR, RRR    Gastrointestinal:                 Incision:    Genitourinary:    Extremities: (-) edema                  I&O's Detail    26 Mar 2025 07:01  -  27 Mar 2025 06:16  --------------------------------------------------------  IN:    IV PiggyBack: 400 mL    Lactated Ringers: 2100 mL    Lactated Ringers Bolus: 1000 mL  Total IN: 3500 mL    OUT:    Bulb (mL): 155 mL    Bulb (mL): 95 mL    Indwelling Catheter - Urethral (mL): 835 mL    Nasogastric/Oral tube (mL): 50 mL    Oral Fluid: 0 mL  Total OUT: 1135 mL    Total NET: 2365 mL          LABS:                        10.7   12.60 )-----------( 216      ( 26 Mar 2025 17:24 )             34.9     03-26    141  |  106  |  12  ----------------------------<  129[H]  3.2[L]   |  20[L]  |  0.78    Ca    8.2[L]      26 Mar 2025 17:24    TPro  5.6[L]  /  Alb  3.3  /  TBili  1.3[H]  /  DBili  x   /  AST  84[H]  /  ALT  31  /  AlkPhos  68  03-26    PT/INR - ( 26 Mar 2025 17:24 )   PT: 14.6 sec;   INR: 1.25            Urinalysis Basic - ( 26 Mar 2025 17:24 )    Color: x / Appearance: x / SG: x / pH: x  Gluc: 129 mg/dL / Ketone: x  / Bili: x / Urobili: x   Blood: x / Protein: x / Nitrite: x   Leuk Esterase: x / RBC: x / WBC x   Sq Epi: x / Non Sq Epi: x / Bacteria: x        RADIOLOGY & ADDITIONAL STUDIES: SUBJECTIVE: Pt seen and examined at bedside this AM. She endorses mild abdominal pain, denies flatus or BM.       MEDICATIONS  (STANDING):  acetaminophen   IVPB .. 1000 milliGRAM(s) IV Intermittent every 6 hours  BUpivacaine liposome 1.3% Injectable (no eMAR) 20 milliLiter(s) Local Injection once  heparin   Injectable 5000 Unit(s) SubCutaneous every 8 hours  HYDROmorphone PCA (1 mG/mL) 30 milliLiter(s) PCA Continuous PCA Continuous  lactated ringers. 1000 milliLiter(s) (150 mL/Hr) IV Continuous <Continuous>  pantoprazole  Injectable 40 milliGRAM(s) IV Push daily    MEDICATIONS  (PRN):  naloxone Injectable 0.1 milliGRAM(s) IV Push every 3 minutes PRN For ANY of the following changes in patient status:  A. RR LESS THAN 10 breaths per minute, B. Oxygen saturation LESS THAN 90%, C. Sedation score of 6  ondansetron Injectable 4 milliGRAM(s) IV Push every 6 hours PRN Nausea      Vital Signs Last 24 Hrs  T(C): 36.9 (27 Mar 2025 04:51), Max: 37.6 (26 Mar 2025 23:20)  T(F): 98.4 (27 Mar 2025 04:51), Max: 99.7 (26 Mar 2025 23:20)  HR: 82 (27 Mar 2025 04:00) (82 - 95)  BP: 106/56 (27 Mar 2025 04:00) (106/56 - 152/82)  BP(mean): 75 (27 Mar 2025 04:00) (75 - 108)  RR: 16 (27 Mar 2025 04:00) (10 - 25)  SpO2: 97% (27 Mar 2025 04:00) (93% - 100%)    Parameters below as of 27 Mar 2025 04:00  Patient On (Oxygen Delivery Method): room air        PHYSICAL EXAM:  General: NAD, pt resting comfortably in bed  Pulm: No respiratory distress, nonlabored breathing, on room air  CVS: NSR, HDS    NGT: with scant, thin green fluid  Abd: Soft, appropriate talha incisional tenderness, ND      SEAN SS x2  Extremities: WWP, no edema                      I&O's Detail    26 Mar 2025 07:01  -  27 Mar 2025 06:16  --------------------------------------------------------  IN:    IV PiggyBack: 400 mL    Lactated Ringers: 2100 mL    Lactated Ringers Bolus: 1000 mL  Total IN: 3500 mL    OUT:    Bulb (mL): 155 mL    Bulb (mL): 95 mL    Indwelling Catheter - Urethral (mL): 835 mL    Nasogastric/Oral tube (mL): 50 mL    Oral Fluid: 0 mL  Total OUT: 1135 mL    Total NET: 2365 mL          LABS:                        10.7   12.60 )-----------( 216      ( 26 Mar 2025 17:24 )             34.9     03-26    141  |  106  |  12  ----------------------------<  129[H]  3.2[L]   |  20[L]  |  0.78    Ca    8.2[L]      26 Mar 2025 17:24    TPro  5.6[L]  /  Alb  3.3  /  TBili  1.3[H]  /  DBili  x   /  AST  84[H]  /  ALT  31  /  AlkPhos  68  03-26    PT/INR - ( 26 Mar 2025 17:24 )   PT: 14.6 sec;   INR: 1.25            Urinalysis Basic - ( 26 Mar 2025 17:24 )    Color: x / Appearance: x / SG: x / pH: x  Gluc: 129 mg/dL / Ketone: x  / Bili: x / Urobili: x   Blood: x / Protein: x / Nitrite: x   Leuk Esterase: x / RBC: x / WBC x   Sq Epi: x / Non Sq Epi: x / Bacteria: x        RADIOLOGY & ADDITIONAL STUDIES:

## 2025-03-27 NOTE — DISCHARGE NOTE PROVIDER - CARE PROVIDER_API CALL
Maury Shipley  Surgical Oncology  [address] [address]  Phone: [phone]  Fax: [phone]  Follow Up Time: 2 weeks   Maury Shipley  Surgical Oncology  [address] [address]  Phone: [phone]  Fax: [phone]  Follow Up Time: 2 weeks    Jamie Cisneros  Surgery  [address] [place] [address],  [address]  [place] [address]  Phone: [phone]  Fax: [phone]  Follow Up Time: 1 month

## 2025-03-27 NOTE — DISCHARGE NOTE PROVIDER - NSDCMRMEDTOKEN_GEN_ALL_CORE_FT
Drisdol 1.25 mg (50,000 intl units) oral capsule: 1 cap(s) orally once a week   amoxicillin-clavulanate 875 mg-125 mg oral tablet: 1 tab(s) orally every 12 hours  Colace 100 mg oral capsule: 1 cap(s) orally every 12 hours as needed for  constipation  Drisdol 1.25 mg (50,000 intl units) oral capsule: 1 cap(s) orally once a week  oxyCODONE 5 mg oral tablet: 1 tab(s) orally every 6 hours as needed for  severe pain MDD: 4   amoxicillin-clavulanate 875 mg-125 mg oral tablet: 1 tab(s) orally every 12 hours  Colace 100 mg oral capsule: 1 cap(s) orally every 12 hours as needed for  constipation  Creon 24,000 units oral delayed release capsule: 2 cap(s) orally 3 times a day (with meals)  Drisdol 1.25 mg (50,000 intl units) oral capsule: 1 cap(s) orally once a week  gabapentin 100 mg oral tablet: 1 tab(s) orally every 8 hours  ibuprofen 600 mg oral tablet: 1 tab(s) orally every 6 hours as needed for  mild pain  pantoprazole 40 mg oral delayed release tablet: 1 tab(s) orally once a day (at bedtime)  senna (sennosides) 12 mg oral tablet: 1 tab(s) orally once a day as needed for  constipation  traMADol 50 mg oral tablet: 1 tab(s) orally every 4 hours as needed for  moderate pain MDD: 6

## 2025-03-27 NOTE — PROGRESS NOTE ADULT - ASSESSMENT
60yo F with PMH of HTN and no significant PSx found to have a 3cm polypoid lesion of the appendiceal orifice on screening colonoscopy with 3cm pancreatic head cyst concerning for neuroendocrine tumor on f/u CT scan. Subsequent EGD/EUS confirmed neuroendocrine pathology. Now s/p open whipple and right hemicolectomy on 3/26.    Telemetry  Whipple Pathway  Dilaudid PCA/IV Tylenol/Zofran  NGT to LCWS  Subramanian  SEAN drain x2  Protonix 40mg IV qd  HSQ/SCDs/IS/OOBA  AM labs

## 2025-03-27 NOTE — DISCHARGE NOTE PROVIDER - NSDCFUADDINST_GEN_ALL_CORE_FT
Follow up with Dr. Shipley in 1-2 weeks. Call the office at the number below to schedule your appointment. You may shower; soap and water over incision sites. Do not scrub. Pat dry when done. No tub bathing or swimming until cleared. Keep incision sites out of the sun as scars will darken. Ambulate as tolerated, but no heavy lifting (>10lbs) or strenuous exercise. You may resume _______ diet. You should be urinating at least 3-4x per day. Call the office if you experience increasing abdominal pain, nausea, vomiting, or temperature >101 F.    General Discharge Instructions:  Please resume all regular home medications unless specifically advised not to take a particular medication. Also, please take any new medications as prescribed.  Please get plenty of rest, continue to ambulate several times per day, and drink adequate amounts of fluids. Avoid lifting weights greater than 5-10 lbs until you follow-up with your surgeon, who will instruct you further regarding activity restrictions.  Avoid driving or operating heavy machinery while taking pain medications.  Please follow-up with your surgeon and Primary Care Provider (PCP) as advised.  Incision Care:  *Please call your doctor or nurse practitioner if you have increased pain, swelling, redness, or drainage from the incision site.  *Avoid swimming and baths until your follow-up appointment.  *You may shower, and wash surgical incisions with a mild soap and warm water. Gently pat the area dry.  *If you have staples, they will be removed at your follow-up appointment.  *If you have steri-strips, they will fall off on their own. Please remove any remaining strips 7-10 days after surgery.    Warning Signs:  Please call your doctor or nurse practitioner if you experience the following:  *You experience new chest pain, pressure, squeezing or tightness.  *New or worsening cough, shortness of breath, or wheeze.  *If you are vomiting and cannot keep down fluids or your medications.  *You are getting dehydrated due to continued vomiting, diarrhea, or other reasons. Signs of dehydration include dry mouth, rapid heartbeat, or feeling dizzy or faint when standing.  *You see blood or dark/black material when you vomit or have a bowel movement.  *You experience burning when you urinate, have blood in your urine, or experience a discharge.  *Your pain is not improving within 8-12 hours or is not gone within 24 hours. Call or return immediately if your pain is getting worse, changes location, or moves to your chest or back.  *You have shaking chills, or fever greater than 101.5 degrees Fahrenheit or 38 degrees Celsius.  *Any change in your symptoms, or any new symptoms that concern you.   Follow up with Dr. Shipley in 1-2 weeks. Call the office at the number below to schedule your appointment. You may shower; soap and water over incision sites. Do not scrub. Pat dry when done. No tub bathing or swimming until cleared. Keep incision sites out of the sun as scars will darken. Ambulate as tolerated, but no heavy lifting (>10lbs) or strenuous exercise. You may resume _______ diet. You should be urinating at least 3-4x per day. Call the office if you experience increasing abdominal pain, nausea, vomiting, or temperature >101 F.    General Discharge Instructions:  Please resume all regular home medications unless specifically advised not to take a particular medication. Also, please take any new medications as prescribed.  Please get plenty of rest, continue to ambulate several times per day, and drink adequate amounts of fluids. Avoid lifting weights greater than 5-10 lbs until you follow-up with your surgeon, who will instruct you further regarding activity restrictions.  Avoid driving or operating heavy machinery while taking pain medications.  Please follow-up with your surgeon and Primary Care Provider (PCP) as advised.    Incision Care:  *Please call your doctor or nurse practitioner if you have increased pain, swelling, redness, or drainage from the incision site.  *Avoid swimming and baths until your follow-up appointment.  *You may shower, and wash surgical incisions with a mild soap and warm water. Gently pat the area dry.  *If you have staples, they will be removed at your follow-up appointment.  *If you have steri-strips, they will fall off on their own. Please remove any remaining strips 7-10 days after surgery.    Warning Signs:  Please call your doctor or nurse practitioner if you experience the following:  *You experience new chest pain, pressure, squeezing or tightness.  *New or worsening cough, shortness of breath, or wheeze.  *If you are vomiting and cannot keep down fluids or your medications.  *You are getting dehydrated due to continued vomiting, diarrhea, or other reasons. Signs of dehydration include dry mouth, rapid heartbeat, or feeling dizzy or faint when standing.  *You see blood or dark/black material when you vomit or have a bowel movement.  *You experience burning when you urinate, have blood in your urine, or experience a discharge.  *Your pain is not improving within 8-12 hours or is not gone within 24 hours. Call or return immediately if your pain is getting worse, changes location, or moves to your chest or back.  *You have shaking chills, or fever greater than 101.5 degrees Fahrenheit or 38 degrees Celsius.  *Any change in your symptoms, or any new symptoms that concern you.   Follow up with Dr. Shipley in 1-2 weeks. Call the office at the number below to schedule your appointment. You may shower; soap and water over incision sites. Do not scrub. Pat dry when done. No tub bathing or swimming until cleared. Keep incision sites out of the sun as scars will darken. Ambulate as tolerated, but no heavy lifting (>10lbs) or strenuous exercise. You may resume soft and bite sized diet. You should be urinating at least 3-4x per day. Call the office if you experience increasing abdominal pain, nausea, vomiting, or temperature >101 F.    General Discharge Instructions:  Please resume all regular home medications unless specifically advised not to take a particular medication. Also, please take any new medications as prescribed.  Please get plenty of rest, continue to ambulate several times per day, and drink adequate amounts of fluids. Avoid lifting weights greater than 5-10 lbs until you follow-up with your surgeon, who will instruct you further regarding activity restrictions.  Avoid driving or operating heavy machinery while taking pain medications.  Please follow-up with your surgeon and Primary Care Provider (PCP) as advised.    Incision Care:  *Please call your doctor or nurse practitioner if you have increased pain, swelling, redness, or drainage from the incision site.  *Avoid swimming and baths until your follow-up appointment.  *You may shower, and wash surgical incisions with a mild soap and warm water. Gently pat the area dry.  *If you have staples, they will be removed at your follow-up appointment.  *If you have steri-strips, they will fall off on their own. Please remove any remaining strips 7-10 days after surgery.    Warning Signs:  Please call your doctor or nurse practitioner if you experience the following:  *You experience new chest pain, pressure, squeezing or tightness.  *New or worsening cough, shortness of breath, or wheeze.  *If you are vomiting and cannot keep down fluids or your medications.  *You are getting dehydrated due to continued vomiting, diarrhea, or other reasons. Signs of dehydration include dry mouth, rapid heartbeat, or feeling dizzy or faint when standing.  *You see blood or dark/black material when you vomit or have a bowel movement.  *You experience burning when you urinate, have blood in your urine, or experience a discharge.  *Your pain is not improving within 8-12 hours or is not gone within 24 hours. Call or return immediately if your pain is getting worse, changes location, or moves to your chest or back.  *You have shaking chills, or fever greater than 101.5 degrees Fahrenheit or 38 degrees Celsius.  *Any change in your symptoms, or any new symptoms that concern you.    New medications:  - Ibuprofin 600mg every 6 hours, as needed for pain  - Tylenol 650mg every 6 hours, as needed for pain  - Gabapentin 100mg every 8 hours  - Tramadol 50mg every 4 hours, as needed for pain  - Colace 100mg every 12 hours as needed for constipation  - Pantoprazole 40mg once daily  - Creon (pancrelipase) 24K 2 capsules with meals, 1 capsule with snacks   - Antibiotic: Augmentin 875mg-125mg, every 12 hours, for 7 days

## 2025-03-27 NOTE — DISCHARGE NOTE PROVIDER - NSDCACTIVITY_GEN_ALL_CORE
Showering allowed/Stairs allowed/Walking - Indoors allowed/No heavy lifting/straining/Walking - Outdoors allowed/Activity as tolerated Showering allowed/Stairs allowed/Walking - Indoors allowed/No heavy lifting/straining/Walking - Outdoors allowed

## 2025-03-27 NOTE — DISCHARGE NOTE PROVIDER - PROVIDER TOKENS
PROVIDER:[TOKEN:[[id number]],FOLLOWUP:[2 weeks]] PROVIDER:[TOKEN:[[id number]],FOLLOWUP:[2 weeks]],PROVIDER:[TOKEN:[[id number]],FOLLOWUP:[1 month]]

## 2025-03-27 NOTE — DISCHARGE NOTE PROVIDER - CARE PROVIDERS DIRECT ADDRESSES
[email or web address]

## 2025-03-27 NOTE — DISCHARGE NOTE PROVIDER - HOSPITAL COURSE
58yo F with PMH of HTN and no significant PSx found to have a 3cm polypoid lesion of the appendiceal orifice on screening colonoscopy with 3cm pancreatic head cyst concerning for neuroendocrine tumor on f/u CT scan. Subsequent EGD/EUS confirmed neuroendocrine pathology. Now s/p open whipple and right hemicolectomy on 3/26. Patient's operation was uncomplicated and her POC was within normal limits. Her NGT was removed on POD 1 and calderon catheter was removed on ________. Patient ______ her TOV.    **INCOMPLETE 3/27** 60yo F with PMH of HTN and no significant PSx found to have a 3cm polypoid lesion of the appendiceal orifice on screening colonoscopy with 3cm pancreatic head cyst concerning for neuroendocrine tumor on f/u CT scan. Subsequent EGD/EUS confirmed neuroendocrine pathology. Now s/p open whipple and right hemicolectomy on 3/26. Patient's operation was uncomplicated and her POC was within normal limits. Her NGT was removed on POD 1 and Subramanian catheter was removed on POD2. Patient passed her TOV. Drain and serum amylases were monitored on POD4 and POD5. On POD5, patient was passing flatus and having bowel movements. Her Dilaudid PCA was also discontinued and her pain regimen was transitioned to PO meds. The left SEAN drain was removed.     Diet advanced as tolerated and per return of bowel function. At time of discharge pt is tolerating diet, pain well controlled, pt is ambulating/voiding freely, having adequate bowel function. Pt is hemodynamically stable and medically ready for discharge with outpatient follow-up.     **INCOMPLETE 3/31** 58yo F with PMH of HTN and no significant PSx found to have a 3cm polypoid lesion of the appendiceal orifice on screening colonoscopy with 3cm pancreatic head cyst concerning for neuroendocrine tumor on f/u CT scan. Subsequent EGD/EUS confirmed neuroendocrine pathology. Now s/p open whipple and right hemicolectomy on 3/26. Patient's operation was uncomplicated and her POC was within normal limits. Her NGT was removed on POD 1 and Subramanian catheter was removed on POD2. Patient passed her TOV. Drain and serum amylases were monitored on POD4 and POD5. On POD5, patient was passing flatus and having bowel movements. Her Dilaudid PCA was also discontinued and her pain regimen was transitioned to PO meds. Patient tolerating LRD, ensure TID added as well. The left SEAN drain was removed on 3/31 and the right SEAN drain was removed ____  Diet advanced as tolerated and per return of bowel function. At time of discharge pt is tolerating diet, pain well controlled, pt is ambulating/voiding freely, having adequate bowel function. Pt is hemodynamically stable and medically ready for discharge with outpatient follow-up.     **INCOMPLETE 4/1** 60yo F with PMH of HTN and no significant PSx found to have a 3cm polypoid lesion of the appendiceal orifice on screening colonoscopy with 3cm pancreatic head cyst concerning for neuroendocrine tumor on f/u CT scan. Subsequent EGD/EUS confirmed neuroendocrine pathology. Now s/p open whipple and right hemicolectomy on 3/26. Patient's operation was uncomplicated and her POC was within normal limits. Her NGT was removed on POD 1 and Subramanian catheter was removed on POD2. Patient passed her TOV. Drain and serum amylases were monitored on POD4 and POD5. On POD5, patient was passing flatus and having bowel movements. Her Dilaudid PCA was also discontinued and her pain regimen was transitioned to PO meds. Patient tolerating LRD, ensure TID added as well. Patient was found to have hypophosphatemia, Phosphorous was repleted and improved to 3.7 on 4/2. The left SEAN drain was removed on 3/31 and the right SEAN drain was removed ____  Diet advanced as tolerated and per return of bowel function. At time of discharge pt is tolerating diet, pain well controlled, pt is ambulating/voiding freely, having adequate bowel function. Pt is hemodynamically stable and medically ready for discharge with outpatient follow-up.     **INCOMPLETE 4/1** 60yo F with PMH of HTN and no significant PSx found to have a 3cm polypoid lesion of the appendiceal orifice on screening colonoscopy with 3cm pancreatic head cyst concerning for neuroendocrine tumor on f/u CT scan. Subsequent EGD/EUS confirmed neuroendocrine pathology. Now s/p open whipple and right hemicolectomy on 3/26. Patient's operation was uncomplicated and her POC was within normal limits. Her NGT was removed on POD 1 and Subramanian catheter was removed on POD2. Patient passed her TOV. Drain and serum amylases were monitored on POD4 and POD5. On POD5, patient was passing flatus and having bowel movements. Her Dilaudid PCA was also discontinued and her pain regimen was transitioned to PO meds. Patient tolerating LRD, ensure TID added as well. Patient was found to have hypophosphatemia, Phosphorous was repleted and improved to 3.7 on 4/2. The left SEAN drain was removed on 3/31 and the right SEAN drain was removed 4/7.   Diet advanced as tolerated and per return of bowel function. At time of discharge pt is tolerating diet, pain well controlled, pt is ambulating/voiding freely, having adequate bowel function. Pt is hemodynamically stable and medically ready for discharge with outpatient follow-up.

## 2025-03-27 NOTE — DISCHARGE NOTE PROVIDER - DETAILS OF MALNUTRITION DIAGNOSIS/DIAGNOSES
This patient has been assessed with a concern for Malnutrition and was treated during this hospitalization for the following Nutrition diagnosis/diagnoses:     -  03/31/2025: Mild protein-calorie malnutrition

## 2025-03-28 LAB
ALBUMIN SERPL ELPH-MCNC: 3 G/DL — LOW (ref 3.3–5)
ALP SERPL-CCNC: 61 U/L — SIGNIFICANT CHANGE UP (ref 40–120)
ALT FLD-CCNC: 36 U/L — SIGNIFICANT CHANGE UP (ref 10–45)
ANION GAP SERPL CALC-SCNC: 9 MMOL/L — SIGNIFICANT CHANGE UP (ref 5–17)
APTT BLD: 44.8 SEC — HIGH (ref 24.5–35.6)
AST SERPL-CCNC: 38 U/L — SIGNIFICANT CHANGE UP (ref 10–40)
BILIRUB SERPL-MCNC: 0.3 MG/DL — SIGNIFICANT CHANGE UP (ref 0.2–1.2)
BUN SERPL-MCNC: 6 MG/DL — LOW (ref 7–23)
CALCIUM SERPL-MCNC: 8.1 MG/DL — LOW (ref 8.4–10.5)
CHLORIDE SERPL-SCNC: 102 MMOL/L — SIGNIFICANT CHANGE UP (ref 96–108)
CO2 SERPL-SCNC: 27 MMOL/L — SIGNIFICANT CHANGE UP (ref 22–31)
CREAT SERPL-MCNC: 0.42 MG/DL — LOW (ref 0.5–1.3)
EGFR: 113 ML/MIN/1.73M2 — SIGNIFICANT CHANGE UP
EGFR: 113 ML/MIN/1.73M2 — SIGNIFICANT CHANGE UP
GLUCOSE SERPL-MCNC: 98 MG/DL — SIGNIFICANT CHANGE UP (ref 70–99)
HCT VFR BLD CALC: 31.3 % — LOW (ref 34.5–45)
HGB BLD-MCNC: 9.6 G/DL — LOW (ref 11.5–15.5)
INR BLD: 1.24 — HIGH (ref 0.85–1.16)
MAGNESIUM SERPL-MCNC: 2 MG/DL — SIGNIFICANT CHANGE UP (ref 1.6–2.6)
MCHC RBC-ENTMCNC: 23.4 PG — LOW (ref 27–34)
MCHC RBC-ENTMCNC: 30.7 G/DL — LOW (ref 32–36)
MCV RBC AUTO: 76.2 FL — LOW (ref 80–100)
NRBC BLD AUTO-RTO: 0 /100 WBCS — SIGNIFICANT CHANGE UP (ref 0–0)
PHOSPHATE SERPL-MCNC: 1.7 MG/DL — LOW (ref 2.5–4.5)
PLATELET # BLD AUTO: 164 K/UL — SIGNIFICANT CHANGE UP (ref 150–400)
POTASSIUM SERPL-MCNC: 3.4 MMOL/L — LOW (ref 3.5–5.3)
POTASSIUM SERPL-SCNC: 3.4 MMOL/L — LOW (ref 3.5–5.3)
PROT SERPL-MCNC: 5.8 G/DL — LOW (ref 6–8.3)
PROTHROM AB SERPL-ACNC: 14.5 SEC — HIGH (ref 9.9–13.4)
RBC # BLD: 4.11 M/UL — SIGNIFICANT CHANGE UP (ref 3.8–5.2)
RBC # FLD: 13.7 % — SIGNIFICANT CHANGE UP (ref 10.3–14.5)
SODIUM SERPL-SCNC: 138 MMOL/L — SIGNIFICANT CHANGE UP (ref 135–145)
WBC # BLD: 12.77 K/UL — HIGH (ref 3.8–10.5)
WBC # FLD AUTO: 12.77 K/UL — HIGH (ref 3.8–10.5)

## 2025-03-28 RX ORDER — SOD PHOS DI, MONO/K PHOS MONO 250 MG
3 TABLET ORAL ONCE
Refills: 0 | Status: COMPLETED | OUTPATIENT
Start: 2025-03-28 | End: 2025-03-28

## 2025-03-28 RX ORDER — POTASSIUM CHLORIDE, DEXTROSE MONOHYDRATE AND SODIUM CHLORIDE 150; 5; 900 MG/100ML; G/100ML; MG/100ML
1000 INJECTION, SOLUTION INTRAVENOUS
Refills: 0 | Status: DISCONTINUED | OUTPATIENT
Start: 2025-03-28 | End: 2025-03-30

## 2025-03-28 RX ADMIN — SCOPOLAMINE 1 PATCH: 1 PATCH, EXTENDED RELEASE TRANSDERMAL at 07:36

## 2025-03-28 RX ADMIN — HEPARIN SODIUM 5000 UNIT(S): 1000 INJECTION INTRAVENOUS; SUBCUTANEOUS at 22:43

## 2025-03-28 RX ADMIN — Medication 3 PACKET(S): at 11:13

## 2025-03-28 RX ADMIN — Medication 30 MILLILITER(S): at 07:38

## 2025-03-28 RX ADMIN — HEPARIN SODIUM 5000 UNIT(S): 1000 INJECTION INTRAVENOUS; SUBCUTANEOUS at 06:46

## 2025-03-28 RX ADMIN — HEPARIN SODIUM 5000 UNIT(S): 1000 INJECTION INTRAVENOUS; SUBCUTANEOUS at 14:25

## 2025-03-28 RX ADMIN — Medication 40 MILLIGRAM(S): at 11:15

## 2025-03-28 RX ADMIN — POTASSIUM CHLORIDE, DEXTROSE MONOHYDRATE AND SODIUM CHLORIDE 42 MILLILITER(S): 150; 5; 900 INJECTION, SOLUTION INTRAVENOUS at 14:10

## 2025-03-28 NOTE — PROGRESS NOTE ADULT - SUBJECTIVE AND OBJECTIVE BOX
SUBJECTIVE:      MEDICATIONS  (STANDING):  heparin   Injectable 5000 Unit(s) SubCutaneous every 8 hours  HYDROmorphone PCA (1 mG/mL) 30 milliLiter(s) PCA Continuous PCA Continuous  lactated ringers. 1000 milliLiter(s) (50 mL/Hr) IV Continuous <Continuous>  pantoprazole  Injectable 40 milliGRAM(s) IV Push daily    MEDICATIONS  (PRN):  ketorolac   Injectable 15 milliGRAM(s) IV Push every 6 hours PRN Mild Pain (1 - 3)  naloxone Injectable 0.1 milliGRAM(s) IV Push every 3 minutes PRN For ANY of the following changes in patient status:  A. RR LESS THAN 10 breaths per minute, B. Oxygen saturation LESS THAN 90%, C. Sedation score of 6  ondansetron Injectable 4 milliGRAM(s) IV Push every 6 hours PRN Nausea      Vital Signs Last 24 Hrs  T(C): 37.4 (28 Mar 2025 05:00), Max: 37.4 (28 Mar 2025 05:00)  T(F): 99.4 (28 Mar 2025 05:00), Max: 99.4 (28 Mar 2025 05:00)  HR: 92 (28 Mar 2025 04:25) (82 - 98)  BP: 123/59 (28 Mar 2025 04:25) (104/58 - 130/71)  BP(mean): 85 (28 Mar 2025 04:25) (76 - 94)  RR: 18 (28 Mar 2025 04:25) (18 - 19)  SpO2: 93% (28 Mar 2025 04:25) (93% - 97%)    Parameters below as of 28 Mar 2025 04:25  Patient On (Oxygen Delivery Method): room air        PHYSICAL EXAM:      Constitutional: A&Ox3    Breasts:    Respiratory: non labored breathing, no respiratory distress    Cardiovascular: NSR, RRR    Gastrointestinal:                 Incision:    Genitourinary:    Extremities: (-) edema                  I&O's Detail    26 Mar 2025 07:01  -  27 Mar 2025 07:00  --------------------------------------------------------  IN:    IV PiggyBack: 400 mL    Lactated Ringers: 2100 mL    Lactated Ringers Bolus: 1000 mL  Total IN: 3500 mL    OUT:    Bulb (mL): 155 mL    Bulb (mL): 95 mL    Indwelling Catheter - Urethral (mL): 835 mL    Nasogastric/Oral tube (mL): 50 mL    Oral Fluid: 0 mL  Total OUT: 1135 mL    Total NET: 2365 mL      27 Mar 2025 07:01  -  28 Mar 2025 06:14  --------------------------------------------------------  IN:    Lactated Ringers: 1150 mL  Total IN: 1150 mL    OUT:    Bulb (mL): 35 mL    Bulb (mL): 360 mL    Indwelling Catheter - Urethral (mL): 1650 mL    IV PiggyBack: 0 mL    Oral Fluid: 0 mL    Voided (mL): 775 mL  Total OUT: 2820 mL    Total NET: -1670 mL          LABS:                        9.4    10.55 )-----------( 193      ( 27 Mar 2025 05:30 )             30.9     03-27    140  |  106  |  13  ----------------------------<  114[H]  3.7   |  26  |  0.70    Ca    7.9[L]      27 Mar 2025 05:30  Phos  3.9     03-27  Mg     1.8     03-27    TPro  5.1[L]  /  Alb  3.1[L]  /  TBili  0.5  /  DBili  x   /  AST  72[H]  /  ALT  48[H]  /  AlkPhos  59  03-27    PT/INR - ( 27 Mar 2025 05:30 )   PT: 16.2 sec;   INR: 1.39          PTT - ( 27 Mar 2025 05:30 )  PTT:32.8 sec  Urinalysis Basic - ( 27 Mar 2025 05:30 )    Color: x / Appearance: x / SG: x / pH: x  Gluc: 114 mg/dL / Ketone: x  / Bili: x / Urobili: x   Blood: x / Protein: x / Nitrite: x   Leuk Esterase: x / RBC: x / WBC x   Sq Epi: x / Non Sq Epi: x / Bacteria: x        RADIOLOGY & ADDITIONAL STUDIES: SUBJECTIVE: Pt seen and examined at bedside this AM. Via , pt endorses that her pain is well controlled in bed, but she experiences abdominal soreness when she ambulates. Denies flatus, bm, nausea, or vomiting. Xiomy sips of clears       MEDICATIONS  (STANDING):  heparin   Injectable 5000 Unit(s) SubCutaneous every 8 hours  HYDROmorphone PCA (1 mG/mL) 30 milliLiter(s) PCA Continuous PCA Continuous  lactated ringers. 1000 milliLiter(s) (50 mL/Hr) IV Continuous <Continuous>  pantoprazole  Injectable 40 milliGRAM(s) IV Push daily    MEDICATIONS  (PRN):  ketorolac   Injectable 15 milliGRAM(s) IV Push every 6 hours PRN Mild Pain (1 - 3)  naloxone Injectable 0.1 milliGRAM(s) IV Push every 3 minutes PRN For ANY of the following changes in patient status:  A. RR LESS THAN 10 breaths per minute, B. Oxygen saturation LESS THAN 90%, C. Sedation score of 6  ondansetron Injectable 4 milliGRAM(s) IV Push every 6 hours PRN Nausea      Vital Signs Last 24 Hrs  T(C): 37.4 (28 Mar 2025 05:00), Max: 37.4 (28 Mar 2025 05:00)  T(F): 99.4 (28 Mar 2025 05:00), Max: 99.4 (28 Mar 2025 05:00)  HR: 92 (28 Mar 2025 04:25) (82 - 98)  BP: 123/59 (28 Mar 2025 04:25) (104/58 - 130/71)  BP(mean): 85 (28 Mar 2025 04:25) (76 - 94)  RR: 18 (28 Mar 2025 04:25) (18 - 19)  SpO2: 93% (28 Mar 2025 04:25) (93% - 97%)    Parameters below as of 28 Mar 2025 04:25  Patient On (Oxygen Delivery Method): room air        PHYSICAL EXAM:  General: NAD, pt resting comfortably in bed  Pulm: No respiratory distress, nonlabored breathing, on room air  CVS: NSR, HDS  Abd: Soft, NT, ND, midline incisions C/D/I    SEAN x2 SS  Extremities: WWP, no edema                    I&O's Detail    26 Mar 2025 07:01  -  27 Mar 2025 07:00  --------------------------------------------------------  IN:    IV PiggyBack: 400 mL    Lactated Ringers: 2100 mL    Lactated Ringers Bolus: 1000 mL  Total IN: 3500 mL    OUT:    Bulb (mL): 155 mL    Bulb (mL): 95 mL    Indwelling Catheter - Urethral (mL): 835 mL    Nasogastric/Oral tube (mL): 50 mL    Oral Fluid: 0 mL  Total OUT: 1135 mL    Total NET: 2365 mL      27 Mar 2025 07:01  -  28 Mar 2025 06:14  --------------------------------------------------------  IN:    Lactated Ringers: 1150 mL  Total IN: 1150 mL    OUT:    Bulb (mL): 35 mL    Bulb (mL): 360 mL    Indwelling Catheter - Urethral (mL): 1650 mL    IV PiggyBack: 0 mL    Oral Fluid: 0 mL    Voided (mL): 775 mL  Total OUT: 2820 mL    Total NET: -1670 mL          LABS:                        9.4    10.55 )-----------( 193      ( 27 Mar 2025 05:30 )             30.9     03-27    140  |  106  |  13  ----------------------------<  114[H]  3.7   |  26  |  0.70    Ca    7.9[L]      27 Mar 2025 05:30  Phos  3.9     03-27  Mg     1.8     03-27    TPro  5.1[L]  /  Alb  3.1[L]  /  TBili  0.5  /  DBili  x   /  AST  72[H]  /  ALT  48[H]  /  AlkPhos  59  03-27    PT/INR - ( 27 Mar 2025 05:30 )   PT: 16.2 sec;   INR: 1.39          PTT - ( 27 Mar 2025 05:30 )  PTT:32.8 sec  Urinalysis Basic - ( 27 Mar 2025 05:30 )    Color: x / Appearance: x / SG: x / pH: x  Gluc: 114 mg/dL / Ketone: x  / Bili: x / Urobili: x   Blood: x / Protein: x / Nitrite: x   Leuk Esterase: x / RBC: x / WBC x   Sq Epi: x / Non Sq Epi: x / Bacteria: x        RADIOLOGY & ADDITIONAL STUDIES:

## 2025-03-28 NOTE — PATIENT PROFILE ADULT - HOME ACCESSIBILITY CONCERNS
Group Topic: BH Process Group     Date: 3/28/2025  Start Time: 1615  End Time: 1730  Facilitators: Ghazal Mathews [facility]    Focus: Self Discovery Prompts  Number in attendance: 6    Method: Group  Participation: Refused  Patient Evaluation: Invited - refused to attend       none

## 2025-03-28 NOTE — PROGRESS NOTE ADULT - ASSESSMENT
60yo F with PMH of HTN and no significant PSx found to have a 3cm polypoid lesion of the appendiceal orifice on screening colonoscopy with 3cm pancreatic head cyst concerning for neuroendocrine tumor on f/u CT scan. Subsequent EGD/EUS confirmed neuroendocrine pathology. Now s/p open whipple and right hemicolectomy on 3/26.    Plan per primary team   Team 5 will continue to follow

## 2025-03-28 NOTE — PROGRESS NOTE ADULT - ASSESSMENT
58yo F with PMH of HTN and no significant PSx found to have a 3cm polypoid lesion of the appendiceal orifice on screening colonoscopy with 3cm pancreatic head cyst concerning for neuroendocrine tumor on f/u CT scan. Subsequent EGD/EUS confirmed neuroendocrine pathology. Now s/p open whipple and right hemicolectomy on 3/26.    Telemetry  Sips of clears   Whipple Pathway  Dilaudid PCA/IV Tylenol/Zofran  SEAN drain x2  Protonix 40mg IV qd  HSQ/SCDs/IS/OOBA  AM labs

## 2025-03-28 NOTE — PROGRESS NOTE ADULT - SUBJECTIVE AND OBJECTIVE BOX
POST-OP DAY: #1 s/p open whipple, R hemicolectomy      SUBJECTIVE: Patient seen and examined bedside by chief resident on AM rounds. Pt states she feels well, admits to mild abdominal soreness but denies any acute worsening abdominal pain. Otherwise, pt is tolerating CLD without any n/v. -BF     heparin   Injectable 5000 Unit(s) SubCutaneous every 8 hours    MEDICATIONS  (PRN):  ketorolac   Injectable 15 milliGRAM(s) IV Push every 6 hours PRN Mild Pain (1 - 3)  naloxone Injectable 0.1 milliGRAM(s) IV Push every 3 minutes PRN For ANY of the following changes in patient status:  A. RR LESS THAN 10 breaths per minute, B. Oxygen saturation LESS THAN 90%, C. Sedation score of 6  ondansetron Injectable 4 milliGRAM(s) IV Push every 6 hours PRN Nausea      I&O's Detail    27 Mar 2025 07:01  -  28 Mar 2025 07:00  --------------------------------------------------------  IN:    Lactated Ringers: 1250 mL  Total IN: 1250 mL    OUT:    Bulb (mL): 35 mL    Bulb (mL): 360 mL    Indwelling Catheter - Urethral (mL): 1650 mL    IV PiggyBack: 0 mL    Oral Fluid: 0 mL    Voided (mL): 975 mL  Total OUT: 3020 mL    Total NET: -1770 mL          Vital Signs Last 24 Hrs  T(C): 37.4 (28 Mar 2025 05:00), Max: 37.4 (28 Mar 2025 05:00)  T(F): 99.4 (28 Mar 2025 05:00), Max: 99.4 (28 Mar 2025 05:00)  HR: 92 (28 Mar 2025 04:25) (82 - 98)  BP: 123/59 (28 Mar 2025 04:25) (104/58 - 130/71)  BP(mean): 85 (28 Mar 2025 04:25) (76 - 94)  RR: 18 (28 Mar 2025 04:25) (18 - 19)  SpO2: 93% (28 Mar 2025 04:25) (93% - 97%)    Parameters below as of 28 Mar 2025 04:25  Patient On (Oxygen Delivery Method): room air        General: NAD, resting comfortably in bed  Pulm: Nonlabored breathing, no respiratory distress  Abd: soft, NT/ND. incisions c/d/i. SEAN x2 serosanguinus output   Extrem: WWP, no edema, SCDs in place    LABS:                        9.4    10.55 )-----------( 193      ( 27 Mar 2025 05:30 )             30.9     03-27    140  |  106  |  13  ----------------------------<  114[H]  3.7   |  26  |  0.70    Ca    7.9[L]      27 Mar 2025 05:30  Phos  3.9     03-27  Mg     2.0     03-28    TPro  5.1[L]  /  Alb  3.1[L]  /  TBili  0.5  /  DBili  x   /  AST  72[H]  /  ALT  48[H]  /  AlkPhos  59  03-27    PT/INR - ( 28 Mar 2025 08:30 )   PT: 14.5 sec;   INR: 1.24          PTT - ( 28 Mar 2025 08:30 )  PTT:44.8 sec  Urinalysis Basic - ( 27 Mar 2025 05:30 )    Color: x / Appearance: x / SG: x / pH: x  Gluc: 114 mg/dL / Ketone: x  / Bili: x / Urobili: x   Blood: x / Protein: x / Nitrite: x   Leuk Esterase: x / RBC: x / WBC x   Sq Epi: x / Non Sq Epi: x / Bacteria: x        RADIOLOGY & ADDITIONAL STUDIES:

## 2025-03-29 LAB
ALBUMIN SERPL ELPH-MCNC: 2.9 G/DL — LOW (ref 3.3–5)
ALP SERPL-CCNC: 55 U/L — SIGNIFICANT CHANGE UP (ref 40–120)
ALT FLD-CCNC: 28 U/L — SIGNIFICANT CHANGE UP (ref 10–45)
AMYLASE FLD-CCNC: 35 U/L — SIGNIFICANT CHANGE UP
AMYLASE FLD-CCNC: SIGNIFICANT CHANGE UP
ANION GAP SERPL CALC-SCNC: 7 MMOL/L — SIGNIFICANT CHANGE UP (ref 5–17)
APTT BLD: 29.9 SEC — SIGNIFICANT CHANGE UP (ref 24.5–35.6)
AST SERPL-CCNC: 29 U/L — SIGNIFICANT CHANGE UP (ref 10–40)
BILIRUB SERPL-MCNC: 0.2 MG/DL — SIGNIFICANT CHANGE UP (ref 0.2–1.2)
BUN SERPL-MCNC: 6 MG/DL — LOW (ref 7–23)
CALCIUM SERPL-MCNC: 7.9 MG/DL — LOW (ref 8.4–10.5)
CHLORIDE SERPL-SCNC: 105 MMOL/L — SIGNIFICANT CHANGE UP (ref 96–108)
CO2 SERPL-SCNC: 30 MMOL/L — SIGNIFICANT CHANGE UP (ref 22–31)
CREAT SERPL-MCNC: 0.44 MG/DL — LOW (ref 0.5–1.3)
EGFR: 111 ML/MIN/1.73M2 — SIGNIFICANT CHANGE UP
EGFR: 111 ML/MIN/1.73M2 — SIGNIFICANT CHANGE UP
GLUCOSE SERPL-MCNC: 117 MG/DL — HIGH (ref 70–99)
HCT VFR BLD CALC: 28.5 % — LOW (ref 34.5–45)
HGB BLD-MCNC: 8.8 G/DL — LOW (ref 11.5–15.5)
INR BLD: 1.08 — SIGNIFICANT CHANGE UP (ref 0.85–1.16)
MAGNESIUM SERPL-MCNC: 2.1 MG/DL — SIGNIFICANT CHANGE UP (ref 1.6–2.6)
MCHC RBC-ENTMCNC: 23.7 PG — LOW (ref 27–34)
MCHC RBC-ENTMCNC: 30.9 G/DL — LOW (ref 32–36)
MCV RBC AUTO: 76.6 FL — LOW (ref 80–100)
NRBC BLD AUTO-RTO: 0 /100 WBCS — SIGNIFICANT CHANGE UP (ref 0–0)
PHOSPHATE SERPL-MCNC: 2.2 MG/DL — LOW (ref 2.5–4.5)
PLATELET # BLD AUTO: 151 K/UL — SIGNIFICANT CHANGE UP (ref 150–400)
POTASSIUM SERPL-MCNC: 3.1 MMOL/L — LOW (ref 3.5–5.3)
POTASSIUM SERPL-SCNC: 3.1 MMOL/L — LOW (ref 3.5–5.3)
PROT SERPL-MCNC: 5.4 G/DL — LOW (ref 6–8.3)
PROTHROM AB SERPL-ACNC: 12.6 SEC — SIGNIFICANT CHANGE UP (ref 9.9–13.4)
RBC # BLD: 3.72 M/UL — LOW (ref 3.8–5.2)
RBC # FLD: 13.8 % — SIGNIFICANT CHANGE UP (ref 10.3–14.5)
SODIUM SERPL-SCNC: 142 MMOL/L — SIGNIFICANT CHANGE UP (ref 135–145)
WBC # BLD: 8.18 K/UL — SIGNIFICANT CHANGE UP (ref 3.8–10.5)
WBC # FLD AUTO: 8.18 K/UL — SIGNIFICANT CHANGE UP (ref 3.8–10.5)

## 2025-03-29 RX ORDER — BISACODYL 5 MG
10 TABLET, DELAYED RELEASE (ENTERIC COATED) ORAL DAILY
Refills: 0 | Status: DISCONTINUED | OUTPATIENT
Start: 2025-03-29 | End: 2025-04-07

## 2025-03-29 RX ORDER — KETOROLAC TROMETHAMINE 30 MG/ML
15 INJECTION, SOLUTION INTRAMUSCULAR; INTRAVENOUS EVERY 6 HOURS
Refills: 0 | Status: DISCONTINUED | OUTPATIENT
Start: 2025-03-29 | End: 2025-04-01

## 2025-03-29 RX ORDER — SENNA 187 MG
1 TABLET ORAL DAILY
Refills: 0 | Status: DISCONTINUED | OUTPATIENT
Start: 2025-03-29 | End: 2025-04-07

## 2025-03-29 RX ORDER — POTASSIUM PHOSPHATE, MONOBASIC POTASSIUM PHOSPHATE, DIBASIC INJECTION, 236; 224 MG/ML; MG/ML
30 SOLUTION, CONCENTRATE INTRAVENOUS ONCE
Refills: 0 | Status: COMPLETED | OUTPATIENT
Start: 2025-03-29 | End: 2025-03-29

## 2025-03-29 RX ADMIN — Medication 30 MILLILITER(S): at 00:14

## 2025-03-29 RX ADMIN — POTASSIUM PHOSPHATE, MONOBASIC POTASSIUM PHOSPHATE, DIBASIC INJECTION, 83.33 MILLIMOLE(S): 236; 224 SOLUTION, CONCENTRATE INTRAVENOUS at 13:12

## 2025-03-29 RX ADMIN — Medication 40 MILLIEQUIVALENT(S): at 11:17

## 2025-03-29 RX ADMIN — POTASSIUM CHLORIDE, DEXTROSE MONOHYDRATE AND SODIUM CHLORIDE 42 MILLILITER(S): 150; 5; 900 INJECTION, SOLUTION INTRAVENOUS at 00:15

## 2025-03-29 RX ADMIN — KETOROLAC TROMETHAMINE 15 MILLIGRAM(S): 30 INJECTION, SOLUTION INTRAMUSCULAR; INTRAVENOUS at 22:02

## 2025-03-29 RX ADMIN — POTASSIUM CHLORIDE, DEXTROSE MONOHYDRATE AND SODIUM CHLORIDE 42 MILLILITER(S): 150; 5; 900 INJECTION, SOLUTION INTRAVENOUS at 22:03

## 2025-03-29 RX ADMIN — HEPARIN SODIUM 5000 UNIT(S): 1000 INJECTION INTRAVENOUS; SUBCUTANEOUS at 15:07

## 2025-03-29 RX ADMIN — Medication 30 MILLILITER(S): at 22:02

## 2025-03-29 RX ADMIN — HEPARIN SODIUM 5000 UNIT(S): 1000 INJECTION INTRAVENOUS; SUBCUTANEOUS at 06:12

## 2025-03-29 RX ADMIN — Medication 40 MILLIGRAM(S): at 11:17

## 2025-03-29 RX ADMIN — SCOPOLAMINE 1 PATCH: 1 PATCH, EXTENDED RELEASE TRANSDERMAL at 08:10

## 2025-03-29 RX ADMIN — HEPARIN SODIUM 5000 UNIT(S): 1000 INJECTION INTRAVENOUS; SUBCUTANEOUS at 22:02

## 2025-03-29 NOTE — PROGRESS NOTE ADULT - SUBJECTIVE AND OBJECTIVE BOX
SUBJECTIVE:      MEDICATIONS  (STANDING):  dextrose 5% + sodium chloride 0.45% with potassium chloride 20 mEq/L 1000 milliLiter(s) (42 mL/Hr) IV Continuous <Continuous>  heparin   Injectable 5000 Unit(s) SubCutaneous every 8 hours  HYDROmorphone PCA (1 mG/mL) 30 milliLiter(s) PCA Continuous PCA Continuous  pantoprazole  Injectable 40 milliGRAM(s) IV Push daily    MEDICATIONS  (PRN):  ketorolac   Injectable 15 milliGRAM(s) IV Push every 6 hours PRN Mild Pain (1 - 3)  naloxone Injectable 0.1 milliGRAM(s) IV Push every 3 minutes PRN For ANY of the following changes in patient status:  A. RR LESS THAN 10 breaths per minute, B. Oxygen saturation LESS THAN 90%, C. Sedation score of 6  ondansetron Injectable 4 milliGRAM(s) IV Push every 6 hours PRN Nausea      Vital Signs Last 24 Hrs  T(C): 37.3 (29 Mar 2025 04:53), Max: 37.7 (28 Mar 2025 14:03)  T(F): 99.1 (29 Mar 2025 04:53), Max: 99.9 (28 Mar 2025 14:03)  HR: 92 (29 Mar 2025 03:55) (92 - 102)  BP: 120/66 (29 Mar 2025 03:55) (119/59 - 136/66)  BP(mean): 87 (29 Mar 2025 03:55) (84 - 99)  RR: 16 (29 Mar 2025 03:55) (16 - 20)  SpO2: 95% (29 Mar 2025 03:55) (92% - 100%)    Parameters below as of 29 Mar 2025 03:55  Patient On (Oxygen Delivery Method): room air        PHYSICAL EXAM:      Constitutional: A&Ox3    Breasts:    Respiratory: non labored breathing, no respiratory distress    Cardiovascular: NSR, RRR    Gastrointestinal:                 Incision:    Genitourinary:    Extremities: (-) edema                  I&O's Detail    27 Mar 2025 07:01  -  28 Mar 2025 07:00  --------------------------------------------------------  IN:    Lactated Ringers: 1250 mL  Total IN: 1250 mL    OUT:    Bulb (mL): 35 mL    Bulb (mL): 360 mL    Indwelling Catheter - Urethral (mL): 1650 mL    IV PiggyBack: 0 mL    Oral Fluid: 0 mL    Voided (mL): 975 mL  Total OUT: 3020 mL    Total NET: -1770 mL      28 Mar 2025 07:01  -  29 Mar 2025 06:45  --------------------------------------------------------  IN:    dextrose 5% + sodium chloride 0.45% w/ Additives: 504 mL    Lactated Ringers: 250 mL    Oral Fluid: 700 mL  Total IN: 1454 mL    OUT:    Bulb (mL): 510 mL    Bulb (mL): 20 mL    Voided (mL): 1600 mL  Total OUT: 2130 mL    Total NET: -676 mL          LABS:                        9.6    12.77 )-----------( 164      ( 28 Mar 2025 08:30 )             31.3     03-28    138  |  102  |  6[L]  ----------------------------<  98  3.4[L]   |  27  |  0.42[L]    Ca    8.1[L]      28 Mar 2025 08:30  Phos  1.7     03-28  Mg     2.0     03-28    TPro  5.8[L]  /  Alb  3.0[L]  /  TBili  0.3  /  DBili  x   /  AST  38  /  ALT  36  /  AlkPhos  61  03-28    PT/INR - ( 28 Mar 2025 08:30 )   PT: 14.5 sec;   INR: 1.24          PTT - ( 28 Mar 2025 08:30 )  PTT:44.8 sec  Urinalysis Basic - ( 28 Mar 2025 08:30 )    Color: x / Appearance: x / SG: x / pH: x  Gluc: 98 mg/dL / Ketone: x  / Bili: x / Urobili: x   Blood: x / Protein: x / Nitrite: x   Leuk Esterase: x / RBC: x / WBC x   Sq Epi: x / Non Sq Epi: x / Bacteria: x        RADIOLOGY & ADDITIONAL STUDIES: SUBJECTIVE: Pt seen on rounds this AM.       MEDICATIONS  (STANDING):  dextrose 5% + sodium chloride 0.45% with potassium chloride 20 mEq/L 1000 milliLiter(s) (42 mL/Hr) IV Continuous <Continuous>  heparin   Injectable 5000 Unit(s) SubCutaneous every 8 hours  HYDROmorphone PCA (1 mG/mL) 30 milliLiter(s) PCA Continuous PCA Continuous  pantoprazole  Injectable 40 milliGRAM(s) IV Push daily    MEDICATIONS  (PRN):  ketorolac   Injectable 15 milliGRAM(s) IV Push every 6 hours PRN Mild Pain (1 - 3)  naloxone Injectable 0.1 milliGRAM(s) IV Push every 3 minutes PRN For ANY of the following changes in patient status:  A. RR LESS THAN 10 breaths per minute, B. Oxygen saturation LESS THAN 90%, C. Sedation score of 6  ondansetron Injectable 4 milliGRAM(s) IV Push every 6 hours PRN Nausea      Vital Signs Last 24 Hrs  T(C): 37.3 (29 Mar 2025 04:53), Max: 37.7 (28 Mar 2025 14:03)  T(F): 99.1 (29 Mar 2025 04:53), Max: 99.9 (28 Mar 2025 14:03)  HR: 92 (29 Mar 2025 03:55) (92 - 102)  BP: 120/66 (29 Mar 2025 03:55) (119/59 - 136/66)  BP(mean): 87 (29 Mar 2025 03:55) (84 - 99)  RR: 16 (29 Mar 2025 03:55) (16 - 20)  SpO2: 95% (29 Mar 2025 03:55) (92% - 100%)    Parameters below as of 29 Mar 2025 03:55  Patient On (Oxygen Delivery Method): room air      PHYSICAL EXAM:    General: NAD, pt resting comfortably in bed  Pulm: No respiratory distress, nonlabored breathing, on room air  CVS: NSR, HDS  Abd: Soft, NT, ND, midline incisions C/D/I    SEAN x2 SS  Extremities: WWP, no edema        I&O's Detail    27 Mar 2025 07:01  -  28 Mar 2025 07:00  --------------------------------------------------------  IN:    Lactated Ringers: 1250 mL  Total IN: 1250 mL    OUT:    Bulb (mL): 35 mL    Bulb (mL): 360 mL    Indwelling Catheter - Urethral (mL): 1650 mL    IV PiggyBack: 0 mL    Oral Fluid: 0 mL    Voided (mL): 975 mL  Total OUT: 3020 mL    Total NET: -1770 mL      28 Mar 2025 07:01  -  29 Mar 2025 06:45  --------------------------------------------------------  IN:    dextrose 5% + sodium chloride 0.45% w/ Additives: 504 mL    Lactated Ringers: 250 mL    Oral Fluid: 700 mL  Total IN: 1454 mL    OUT:    Bulb (mL): 510 mL    Bulb (mL): 20 mL    Voided (mL): 1600 mL  Total OUT: 2130 mL    Total NET: -676 mL          LABS:                        9.6    12.77 )-----------( 164      ( 28 Mar 2025 08:30 )             31.3     03-28    138  |  102  |  6[L]  ----------------------------<  98  3.4[L]   |  27  |  0.42[L]    Ca    8.1[L]      28 Mar 2025 08:30  Phos  1.7     03-28  Mg     2.0     03-28    TPro  5.8[L]  /  Alb  3.0[L]  /  TBili  0.3  /  DBili  x   /  AST  38  /  ALT  36  /  AlkPhos  61  03-28    PT/INR - ( 28 Mar 2025 08:30 )   PT: 14.5 sec;   INR: 1.24          PTT - ( 28 Mar 2025 08:30 )  PTT:44.8 sec  Urinalysis Basic - ( 28 Mar 2025 08:30 )    Color: x / Appearance: x / SG: x / pH: x  Gluc: 98 mg/dL / Ketone: x  / Bili: x / Urobili: x   Blood: x / Protein: x / Nitrite: x   Leuk Esterase: x / RBC: x / WBC x   Sq Epi: x / Non Sq Epi: x / Bacteria: x        RADIOLOGY & ADDITIONAL STUDIES:

## 2025-03-29 NOTE — PROGRESS NOTE ADULT - ASSESSMENT
60yo F with PMH of HTN and no significant PSx found to have a 3cm polypoid lesion of the appendiceal orifice on screening colonoscopy with 3cm pancreatic head cyst concerning for neuroendocrine tumor on f/u CT scan. Subsequent EGD/EUS confirmed neuroendocrine pathology. Now s/p open whipple and right hemicolectomy on 3/26.    Telemetry  FLD/ IVF 42cc  Whipple Pathway  Dilaudid PCA/IV Tylenol/Zofran  SEAN drain x2  Protonix 40mg IV qd  HSQ/SCDs/IS/OOBA  AM labs  SEAN amylase

## 2025-03-30 LAB
ADD ON TEST-SPECIMEN IN LAB: SIGNIFICANT CHANGE UP
ALBUMIN SERPL ELPH-MCNC: 2.9 G/DL — LOW (ref 3.3–5)
ALP SERPL-CCNC: 58 U/L — SIGNIFICANT CHANGE UP (ref 40–120)
ALT FLD-CCNC: 30 U/L — SIGNIFICANT CHANGE UP (ref 10–45)
AMYLASE FLD-CCNC: 280 U/L — SIGNIFICANT CHANGE UP
ANION GAP SERPL CALC-SCNC: 10 MMOL/L — SIGNIFICANT CHANGE UP (ref 5–17)
APTT BLD: 30.8 SEC — SIGNIFICANT CHANGE UP (ref 24.5–35.6)
AST SERPL-CCNC: 28 U/L — SIGNIFICANT CHANGE UP (ref 10–40)
BILIRUB SERPL-MCNC: 0.2 MG/DL — SIGNIFICANT CHANGE UP (ref 0.2–1.2)
BUN SERPL-MCNC: 5 MG/DL — LOW (ref 7–23)
CALCIUM SERPL-MCNC: 8 MG/DL — LOW (ref 8.4–10.5)
CHLORIDE SERPL-SCNC: 107 MMOL/L — SIGNIFICANT CHANGE UP (ref 96–108)
CO2 SERPL-SCNC: 26 MMOL/L — SIGNIFICANT CHANGE UP (ref 22–31)
CREAT SERPL-MCNC: 0.45 MG/DL — LOW (ref 0.5–1.3)
EGFR: 111 ML/MIN/1.73M2 — SIGNIFICANT CHANGE UP
EGFR: 111 ML/MIN/1.73M2 — SIGNIFICANT CHANGE UP
GLUCOSE SERPL-MCNC: 105 MG/DL — HIGH (ref 70–99)
HCT VFR BLD CALC: 32.5 % — LOW (ref 34.5–45)
HGB BLD-MCNC: 9.6 G/DL — LOW (ref 11.5–15.5)
INR BLD: 0.96 — SIGNIFICANT CHANGE UP (ref 0.85–1.16)
MAGNESIUM SERPL-MCNC: 2.2 MG/DL — SIGNIFICANT CHANGE UP (ref 1.6–2.6)
MCHC RBC-ENTMCNC: 22.7 PG — LOW (ref 27–34)
MCHC RBC-ENTMCNC: 29.5 G/DL — LOW (ref 32–36)
MCV RBC AUTO: 76.8 FL — LOW (ref 80–100)
NRBC BLD AUTO-RTO: 0 /100 WBCS — SIGNIFICANT CHANGE UP (ref 0–0)
PHOSPHATE SERPL-MCNC: 1.7 MG/DL — LOW (ref 2.5–4.5)
PLATELET # BLD AUTO: 195 K/UL — SIGNIFICANT CHANGE UP (ref 150–400)
POTASSIUM SERPL-MCNC: 3.6 MMOL/L — SIGNIFICANT CHANGE UP (ref 3.5–5.3)
POTASSIUM SERPL-SCNC: 3.6 MMOL/L — SIGNIFICANT CHANGE UP (ref 3.5–5.3)
PROT SERPL-MCNC: 5.6 G/DL — LOW (ref 6–8.3)
PROTHROM AB SERPL-ACNC: 11 SEC — SIGNIFICANT CHANGE UP (ref 9.9–13.4)
RBC # BLD: 4.23 M/UL — SIGNIFICANT CHANGE UP (ref 3.8–5.2)
RBC # FLD: 13.9 % — SIGNIFICANT CHANGE UP (ref 10.3–14.5)
SODIUM SERPL-SCNC: 143 MMOL/L — SIGNIFICANT CHANGE UP (ref 135–145)
WBC # BLD: 6.57 K/UL — SIGNIFICANT CHANGE UP (ref 3.8–10.5)
WBC # FLD AUTO: 6.57 K/UL — SIGNIFICANT CHANGE UP (ref 3.8–10.5)

## 2025-03-30 PROCEDURE — 93010 ELECTROCARDIOGRAM REPORT: CPT

## 2025-03-30 RX ORDER — ACETAMINOPHEN 500 MG/5ML
650 LIQUID (ML) ORAL EVERY 6 HOURS
Refills: 0 | Status: DISCONTINUED | OUTPATIENT
Start: 2025-03-30 | End: 2025-03-30

## 2025-03-30 RX ORDER — ACETAMINOPHEN 500 MG/5ML
650 LIQUID (ML) ORAL EVERY 6 HOURS
Refills: 0 | Status: DISCONTINUED | OUTPATIENT
Start: 2025-03-30 | End: 2025-04-07

## 2025-03-30 RX ORDER — FUROSEMIDE 10 MG/ML
20 INJECTION INTRAMUSCULAR; INTRAVENOUS ONCE
Refills: 0 | Status: COMPLETED | OUTPATIENT
Start: 2025-03-30 | End: 2025-03-30

## 2025-03-30 RX ADMIN — FUROSEMIDE 20 MILLIGRAM(S): 10 INJECTION INTRAMUSCULAR; INTRAVENOUS at 09:58

## 2025-03-30 RX ADMIN — Medication 40 MILLIEQUIVALENT(S): at 12:11

## 2025-03-30 RX ADMIN — KETOROLAC TROMETHAMINE 15 MILLIGRAM(S): 30 INJECTION, SOLUTION INTRAMUSCULAR; INTRAVENOUS at 06:53

## 2025-03-30 RX ADMIN — HEPARIN SODIUM 5000 UNIT(S): 1000 INJECTION INTRAVENOUS; SUBCUTANEOUS at 06:09

## 2025-03-30 RX ADMIN — KETOROLAC TROMETHAMINE 15 MILLIGRAM(S): 30 INJECTION, SOLUTION INTRAMUSCULAR; INTRAVENOUS at 18:11

## 2025-03-30 RX ADMIN — Medication 650 MILLIGRAM(S): at 23:36

## 2025-03-30 RX ADMIN — KETOROLAC TROMETHAMINE 15 MILLIGRAM(S): 30 INJECTION, SOLUTION INTRAMUSCULAR; INTRAVENOUS at 12:41

## 2025-03-30 RX ADMIN — KETOROLAC TROMETHAMINE 15 MILLIGRAM(S): 30 INJECTION, SOLUTION INTRAMUSCULAR; INTRAVENOUS at 06:09

## 2025-03-30 RX ADMIN — KETOROLAC TROMETHAMINE 15 MILLIGRAM(S): 30 INJECTION, SOLUTION INTRAMUSCULAR; INTRAVENOUS at 12:11

## 2025-03-30 RX ADMIN — KETOROLAC TROMETHAMINE 15 MILLIGRAM(S): 30 INJECTION, SOLUTION INTRAMUSCULAR; INTRAVENOUS at 18:45

## 2025-03-30 RX ADMIN — HEPARIN SODIUM 5000 UNIT(S): 1000 INJECTION INTRAVENOUS; SUBCUTANEOUS at 23:35

## 2025-03-30 RX ADMIN — KETOROLAC TROMETHAMINE 15 MILLIGRAM(S): 30 INJECTION, SOLUTION INTRAMUSCULAR; INTRAVENOUS at 23:35

## 2025-03-30 RX ADMIN — Medication 40 MILLIGRAM(S): at 12:11

## 2025-03-30 RX ADMIN — HEPARIN SODIUM 5000 UNIT(S): 1000 INJECTION INTRAVENOUS; SUBCUTANEOUS at 16:43

## 2025-03-30 RX ADMIN — KETOROLAC TROMETHAMINE 15 MILLIGRAM(S): 30 INJECTION, SOLUTION INTRAMUSCULAR; INTRAVENOUS at 00:16

## 2025-03-30 RX ADMIN — Medication 30 MILLILITER(S): at 23:36

## 2025-03-30 NOTE — PROGRESS NOTE ADULT - SUBJECTIVE AND OBJECTIVE BOX
INTERVAL HPI/OVERNIGHT EVENTS:: Pt reports long sustained bouts of RLQ Abd pain, standing toradol started, PRN Supoository, and Senna added, AFVSS      STATUS POST:  3/26: s/p open whipple and right hemicolectomy. , IVF 3000,       SUBJECTIVE: Pt seen and examined at bedside this am by surgery team. Tolerating diet, pain well controlled. Denies f/n/v/cp/sob.    MEDICATIONS  (STANDING):  dextrose 5% + sodium chloride 0.45% with potassium chloride 20 mEq/L 1000 milliLiter(s) (42 mL/Hr) IV Continuous <Continuous>  heparin   Injectable 5000 Unit(s) SubCutaneous every 8 hours  HYDROmorphone PCA (1 mG/mL) 30 milliLiter(s) PCA Continuous PCA Continuous  ketorolac   Injectable 15 milliGRAM(s) IV Push every 6 hours  pantoprazole  Injectable 40 milliGRAM(s) IV Push daily    MEDICATIONS  (PRN):  bisacodyl Suppository 10 milliGRAM(s) Rectal daily PRN Constipation  naloxone Injectable 0.1 milliGRAM(s) IV Push every 3 minutes PRN For ANY of the following changes in patient status:  A. RR LESS THAN 10 breaths per minute, B. Oxygen saturation LESS THAN 90%, C. Sedation score of 6  ondansetron Injectable 4 milliGRAM(s) IV Push every 6 hours PRN Nausea  senna 1 Tablet(s) Oral daily PRN Constipation      Vital Signs Last 24 Hrs  T(C): 36.8 (30 Mar 2025 05:13), Max: 37.3 (29 Mar 2025 09:20)  T(F): 98.3 (30 Mar 2025 05:13), Max: 99.1 (29 Mar 2025 09:20)  HR: 78 (30 Mar 2025 04:00) (78 - 86)  BP: 126/68 (30 Mar 2025 04:00) (117/59 - 131/74)  BP(mean): 91 (30 Mar 2025 04:00) (80 - 97)  RR: 18 (30 Mar 2025 04:00) (15 - 18)  SpO2: 96% (30 Mar 2025 04:00) (94% - 97%)    Parameters below as of 30 Mar 2025 04:00  Patient On (Oxygen Delivery Method): room air        PHYSICAL EXAM:  General: NAD, pt resting comfortably in bed  Pulm: No respiratory distress, nonlabored breathing, on room air  CVS: NSR, HDS  Abd: Soft, NT, ND, midline incisions C/D/I    SEAN x2 SS  Extremities: WWP, no edema                I&O's Detail    29 Mar 2025 07:01  -  30 Mar 2025 07:00  --------------------------------------------------------  IN:    dextrose 5% + sodium chloride 0.45% w/ Additives: 798 mL    Oral Fluid: 150 mL  Total IN: 948 mL    OUT:    Bulb (mL): 45 mL    Bulb (mL): 820 mL    Voided (mL): 725 mL  Total OUT: 1590 mL    Total NET: -642 mL          LABS:                        9.6    6.57  )-----------( 195      ( 30 Mar 2025 05:30 )             32.5     03-30    143  |  107  |  5[L]  ----------------------------<  105[H]  3.6   |  26  |  0.45[L]    Ca    8.0[L]      30 Mar 2025 05:30  Phos  1.7     03-30  Mg     2.2     03-30    TPro  5.6[L]  /  Alb  2.9[L]  /  TBili  0.2  /  DBili  x   /  AST  28  /  ALT  30  /  AlkPhos  58  03-30    PT/INR - ( 30 Mar 2025 05:30 )   PT: 11.0 sec;   INR: 0.96          PTT - ( 30 Mar 2025 05:30 )  PTT:30.8 sec  Urinalysis Basic - ( 30 Mar 2025 05:30 )    Color: x / Appearance: x / SG: x / pH: x  Gluc: 105 mg/dL / Ketone: x  / Bili: x / Urobili: x   Blood: x / Protein: x / Nitrite: x   Leuk Esterase: x / RBC: x / WBC x   Sq Epi: x / Non Sq Epi: x / Bacteria: x        RADIOLOGY & ADDITIONAL STUDIES: INTERVAL HPI/OVERNIGHT EVENTS:: Pt reports long sustained bouts of RLQ Abd pain, standing toradol started, PRN Supoository, and Senna added, AFVSS      STATUS POST:  3/26: s/p open whipple and right hemicolectomy. , IVF 3000,       SUBJECTIVE: Pt seen and examined at bedside this am by surgery team. Tolerating cld -n/v, pain well controlled. Denies f/n/v/cp/sob.    MEDICATIONS  (STANDING):  dextrose 5% + sodium chloride 0.45% with potassium chloride 20 mEq/L 1000 milliLiter(s) (42 mL/Hr) IV Continuous <Continuous>  heparin   Injectable 5000 Unit(s) SubCutaneous every 8 hours  HYDROmorphone PCA (1 mG/mL) 30 milliLiter(s) PCA Continuous PCA Continuous  ketorolac   Injectable 15 milliGRAM(s) IV Push every 6 hours  pantoprazole  Injectable 40 milliGRAM(s) IV Push daily    MEDICATIONS  (PRN):  bisacodyl Suppository 10 milliGRAM(s) Rectal daily PRN Constipation  naloxone Injectable 0.1 milliGRAM(s) IV Push every 3 minutes PRN For ANY of the following changes in patient status:  A. RR LESS THAN 10 breaths per minute, B. Oxygen saturation LESS THAN 90%, C. Sedation score of 6  ondansetron Injectable 4 milliGRAM(s) IV Push every 6 hours PRN Nausea  senna 1 Tablet(s) Oral daily PRN Constipation      Vital Signs Last 24 Hrs  T(C): 36.8 (30 Mar 2025 05:13), Max: 37.3 (29 Mar 2025 09:20)  T(F): 98.3 (30 Mar 2025 05:13), Max: 99.1 (29 Mar 2025 09:20)  HR: 78 (30 Mar 2025 04:00) (78 - 86)  BP: 126/68 (30 Mar 2025 04:00) (117/59 - 131/74)  BP(mean): 91 (30 Mar 2025 04:00) (80 - 97)  RR: 18 (30 Mar 2025 04:00) (15 - 18)  SpO2: 96% (30 Mar 2025 04:00) (94% - 97%)    Parameters below as of 30 Mar 2025 04:00  Patient On (Oxygen Delivery Method): room air        PHYSICAL EXAM:  General: NAD, pt resting comfortably in bed  Pulm: No respiratory distress, nonlabored breathing, on room air  CVS: NSR, HDS  Abd: Soft, NT, ND, midline incisions C/D/I    L SEAN- SS    R SEAN: serous    Extremities: WWP, no edema                I&O's Detail    29 Mar 2025 07:01  -  30 Mar 2025 07:00  --------------------------------------------------------  IN:    dextrose 5% + sodium chloride 0.45% w/ Additives: 798 mL    Oral Fluid: 150 mL  Total IN: 948 mL    OUT:    Bulb (mL): 45 mL    Bulb (mL): 820 mL    Voided (mL): 725 mL  Total OUT: 1590 mL    Total NET: -642 mL          LABS:                        9.6    6.57  )-----------( 195      ( 30 Mar 2025 05:30 )             32.5     03-30    143  |  107  |  5[L]  ----------------------------<  105[H]  3.6   |  26  |  0.45[L]    Ca    8.0[L]      30 Mar 2025 05:30  Phos  1.7     03-30  Mg     2.2     03-30    TPro  5.6[L]  /  Alb  2.9[L]  /  TBili  0.2  /  DBili  x   /  AST  28  /  ALT  30  /  AlkPhos  58  03-30    PT/INR - ( 30 Mar 2025 05:30 )   PT: 11.0 sec;   INR: 0.96          PTT - ( 30 Mar 2025 05:30 )  PTT:30.8 sec  Urinalysis Basic - ( 30 Mar 2025 05:30 )    Color: x / Appearance: x / SG: x / pH: x  Gluc: 105 mg/dL / Ketone: x  / Bili: x / Urobili: x   Blood: x / Protein: x / Nitrite: x   Leuk Esterase: x / RBC: x / WBC x   Sq Epi: x / Non Sq Epi: x / Bacteria: x        RADIOLOGY & ADDITIONAL STUDIES:

## 2025-03-30 NOTE — PROGRESS NOTE ADULT - ASSESSMENT
58yo F with PMH of HTN and no significant PSx found to have a 3cm polypoid lesion of the appendiceal orifice on screening colonoscopy with 3cm pancreatic head cyst concerning for neuroendocrine tumor on f/u CT scan. Subsequent EGD/EUS confirmed neuroendocrine pathology. Now s/p open whipple and right hemicolectomy on 3/26.    Telemetry  CLD/ IVF 42cc  Whipple Pathway  Dilaudid PCA/IV Tylenol/Zofran  SEAN drain x2  Protonix 40mg IV qd  HSQ/SCDs/IS/OOBA  AM labs 60yo F with PMH of HTN and no significant PSx found to have a 3cm polypoid lesion of the appendiceal orifice on screening colonoscopy with 3cm pancreatic head cyst concerning for neuroendocrine tumor on f/u CT scan. Subsequent EGD/EUS confirmed neuroendocrine pathology. Now s/p open whipple and right hemicolectomy on 3/26.      adv to FLD /IVF 42cc  IV lasix x 1 dose  Whipple Pathway  Dilaudid PCA/IV Tylenol/Zofran  SEAN drain x2  Protonix 40mg IV qd  HSQ/SCDs/IS/OOBA  AM labs

## 2025-03-31 LAB
AMYLASE FLD-CCNC: 16 U/L — SIGNIFICANT CHANGE UP
AMYLASE FLD-CCNC: 229 U/L — SIGNIFICANT CHANGE UP
ANION GAP SERPL CALC-SCNC: 12 MMOL/L — SIGNIFICANT CHANGE UP (ref 5–17)
BUN SERPL-MCNC: 4 MG/DL — LOW (ref 7–23)
CALCIUM SERPL-MCNC: 8.3 MG/DL — LOW (ref 8.4–10.5)
CHLORIDE SERPL-SCNC: 98 MMOL/L — SIGNIFICANT CHANGE UP (ref 96–108)
CO2 SERPL-SCNC: 27 MMOL/L — SIGNIFICANT CHANGE UP (ref 22–31)
CREAT SERPL-MCNC: 0.51 MG/DL — SIGNIFICANT CHANGE UP (ref 0.5–1.3)
EGFR: 107 ML/MIN/1.73M2 — SIGNIFICANT CHANGE UP
EGFR: 107 ML/MIN/1.73M2 — SIGNIFICANT CHANGE UP
GLUCOSE SERPL-MCNC: 141 MG/DL — HIGH (ref 70–99)
HCT VFR BLD CALC: 33.3 % — LOW (ref 34.5–45)
HGB BLD-MCNC: 10 G/DL — LOW (ref 11.5–15.5)
MAGNESIUM SERPL-MCNC: 2 MG/DL — SIGNIFICANT CHANGE UP (ref 1.6–2.6)
MCHC RBC-ENTMCNC: 23.4 PG — LOW (ref 27–34)
MCHC RBC-ENTMCNC: 30 G/DL — LOW (ref 32–36)
MCV RBC AUTO: 77.8 FL — LOW (ref 80–100)
NRBC BLD AUTO-RTO: 0 /100 WBCS — SIGNIFICANT CHANGE UP (ref 0–0)
PHOSPHATE SERPL-MCNC: 2.2 MG/DL — LOW (ref 2.5–4.5)
PLATELET # BLD AUTO: 222 K/UL — SIGNIFICANT CHANGE UP (ref 150–400)
POTASSIUM SERPL-MCNC: 3.4 MMOL/L — LOW (ref 3.5–5.3)
POTASSIUM SERPL-SCNC: 3.4 MMOL/L — LOW (ref 3.5–5.3)
RBC # BLD: 4.28 M/UL — SIGNIFICANT CHANGE UP (ref 3.8–5.2)
RBC # FLD: 13.7 % — SIGNIFICANT CHANGE UP (ref 10.3–14.5)
SODIUM SERPL-SCNC: 137 MMOL/L — SIGNIFICANT CHANGE UP (ref 135–145)
WBC # BLD: 5.31 K/UL — SIGNIFICANT CHANGE UP (ref 3.8–10.5)
WBC # FLD AUTO: 5.31 K/UL — SIGNIFICANT CHANGE UP (ref 3.8–10.5)

## 2025-03-31 RX ORDER — SOD PHOS DI, MONO/K PHOS MONO 250 MG
3 TABLET ORAL ONCE
Refills: 0 | Status: COMPLETED | OUTPATIENT
Start: 2025-03-31 | End: 2025-03-31

## 2025-03-31 RX ORDER — OXYCODONE HYDROCHLORIDE 30 MG/1
2.5 TABLET ORAL EVERY 6 HOURS
Refills: 0 | Status: DISCONTINUED | OUTPATIENT
Start: 2025-03-31 | End: 2025-04-01

## 2025-03-31 RX ORDER — OXYCODONE HYDROCHLORIDE 30 MG/1
5 TABLET ORAL EVERY 6 HOURS
Refills: 0 | Status: DISCONTINUED | OUTPATIENT
Start: 2025-03-31 | End: 2025-04-04

## 2025-03-31 RX ADMIN — Medication 650 MILLIGRAM(S): at 07:57

## 2025-03-31 RX ADMIN — KETOROLAC TROMETHAMINE 15 MILLIGRAM(S): 30 INJECTION, SOLUTION INTRAMUSCULAR; INTRAVENOUS at 13:15

## 2025-03-31 RX ADMIN — HEPARIN SODIUM 5000 UNIT(S): 1000 INJECTION INTRAVENOUS; SUBCUTANEOUS at 21:15

## 2025-03-31 RX ADMIN — KETOROLAC TROMETHAMINE 15 MILLIGRAM(S): 30 INJECTION, SOLUTION INTRAMUSCULAR; INTRAVENOUS at 18:51

## 2025-03-31 RX ADMIN — Medication 650 MILLIGRAM(S): at 03:54

## 2025-03-31 RX ADMIN — KETOROLAC TROMETHAMINE 15 MILLIGRAM(S): 30 INJECTION, SOLUTION INTRAMUSCULAR; INTRAVENOUS at 07:57

## 2025-03-31 RX ADMIN — KETOROLAC TROMETHAMINE 15 MILLIGRAM(S): 30 INJECTION, SOLUTION INTRAMUSCULAR; INTRAVENOUS at 06:05

## 2025-03-31 RX ADMIN — Medication 3 PACKET(S): at 14:59

## 2025-03-31 RX ADMIN — Medication 650 MILLIGRAM(S): at 12:18

## 2025-03-31 RX ADMIN — KETOROLAC TROMETHAMINE 15 MILLIGRAM(S): 30 INJECTION, SOLUTION INTRAMUSCULAR; INTRAVENOUS at 23:31

## 2025-03-31 RX ADMIN — Medication 40 MILLIGRAM(S): at 12:18

## 2025-03-31 RX ADMIN — KETOROLAC TROMETHAMINE 15 MILLIGRAM(S): 30 INJECTION, SOLUTION INTRAMUSCULAR; INTRAVENOUS at 03:55

## 2025-03-31 RX ADMIN — Medication 650 MILLIGRAM(S): at 13:15

## 2025-03-31 RX ADMIN — KETOROLAC TROMETHAMINE 15 MILLIGRAM(S): 30 INJECTION, SOLUTION INTRAMUSCULAR; INTRAVENOUS at 12:18

## 2025-03-31 RX ADMIN — Medication 650 MILLIGRAM(S): at 19:30

## 2025-03-31 RX ADMIN — HEPARIN SODIUM 5000 UNIT(S): 1000 INJECTION INTRAVENOUS; SUBCUTANEOUS at 14:59

## 2025-03-31 RX ADMIN — Medication 650 MILLIGRAM(S): at 06:05

## 2025-03-31 RX ADMIN — Medication 650 MILLIGRAM(S): at 18:51

## 2025-03-31 RX ADMIN — KETOROLAC TROMETHAMINE 15 MILLIGRAM(S): 30 INJECTION, SOLUTION INTRAMUSCULAR; INTRAVENOUS at 19:30

## 2025-03-31 RX ADMIN — Medication 650 MILLIGRAM(S): at 23:31

## 2025-03-31 RX ADMIN — HEPARIN SODIUM 5000 UNIT(S): 1000 INJECTION INTRAVENOUS; SUBCUTANEOUS at 06:05

## 2025-03-31 NOTE — PROGRESS NOTE ADULT - ASSESSMENT
58yo F with PMH of HTN and no significant PSx found to have a 3cm polypoid lesion of the appendiceal orifice on screening colonoscopy with 3cm pancreatic head cyst concerning for neuroendocrine tumor on f/u CT scan. Subsequent EGD/EUS confirmed neuroendocrine pathology. Now s/p open whipple and right hemicolectomy on 3/26.    FLD  Whipple Pathway  Pain and nausea control  SEAN drain x2  Protonix 40mg IV qd  HSQ/SCDs/IS/OOBA  AM labs

## 2025-03-31 NOTE — DIETITIAN INITIAL EVALUATION ADULT - OTHER INFO
This is a 59 year old female with a past medical history significant for HTN and no significant PSx found to have a 3cm polypoid lesion of the appendiceal orifice on screening colonoscopy with 3cm pancreatic head cyst concerning for neuroendocrine tumor on f/u CT scan. Subsequent EGD/EUS confirmed neuroendocrine pathology.    Pt seen in room for nutrition assessment. Pt speaks Thomas,  utilized, ID number [id number] ([email or web address]). Pt noted with fair to low appetite PTA and low appetite during hospital stay. As per diet recall PTA: pt was eating small portion meals, including rice, fish, meat, vegetables, fats/oils, etc. Currently on regular diet, tolerating variably, some fair, some poor PO intakes, </=50% overall. Pt's diet was just advanced this morning to regular diet solid foods (previously on full liquids diet 3/30/25). No cultural, Jew, or ethnic food preferences noted. No known food allergies. No supplements (micronutrient, oral nutrition supplements) at home noted. Pt noted with some wt changes, reports some weight loss, unsure of the exact amount. Pt stated her usual body weight was ~120 pounds. Dosing wt: ~110.5 pounds, Ideal body weight: ~120 pounds, pt is ~92% of ideal body weight. No noted major signs/symptoms of nausea, vomiting, pt noted she has had some loose stool today, RD notified pt's nurse. No documented edema per nursing. Skin: right and left SEAN drains noted; no pressure ulcers noted. Rich: 21. No issues chewing or swallowing noted. Noted with mild (level 3) pain. Labs reviewed: low BUN (5), creatinine (0.45), phosphorus (1.7), RD concerned about risk of REFEEDING syndrome; medical team is aware. RD to continue to monitor trends. Nutritionally pertinent medications/supplements: senna, zofran, protonix, ducolax. Per nutrition focused physical exam, pt noted with no muscle or fat loss. However, since pt noted weight loss and recent poor PO intakes this admission, per ASPEN guidelines, pt meets criteria for mild malnutrition. Pt amenable to education; RD provided education in regards to the importance of adequate macro and micronutrients, as well as hydration to support ADLs, maintain energy levels and overall functional/nutritional status. General healthful education provided. Pt appeared overall receptive and verbalized understanding. No additional nutrition-related concerns. Will continue to follow. Additional nutrition recommendations below to follow. This is a 59 year old female with a past medical history significant for HTN and no significant PSx found to have a 3cm polypoid lesion of the appendiceal orifice on screening colonoscopy with 3cm pancreatic head cyst concerning for neuroendocrine tumor on f/u CT scan. Subsequent EGD/EUS confirmed neuroendocrine pathology.    Pt seen in room for nutrition assessment. Pt speaks Thomas,  utilized, ID number [id number] (Gely). Pt noted with fair to low appetite PTA and low appetite during hospital stay. As per diet recall PTA: pt was eating small portion meals, including rice, fish, meat, vegetables, fats/oils, etc. Currently on regular diet, tolerating variably, some fair, some poor PO intakes, </=50% overall. Pt's diet was just advanced this morning to regular diet solid foods (previously on full liquids diet 3/30/25). No cultural, Cheondoism, or ethnic food preferences noted. No known food allergies. No supplements (micronutrient, oral nutrition supplements) at home noted. Pt noted with some wt changes, reports some weight loss, unsure of the exact amount. Pt stated her usual body weight was ~120 pounds. Dosing wt: ~110.5 pounds, Ideal body weight: ~120 pounds, pt is ~92% of ideal body weight. No noted major signs/symptoms of nausea, vomiting, pt noted she has had some loose stool today, RD notified pt's nurse. No documented edema per nursing. Skin: right and left SEAN drains noted; no pressure ulcers noted. Rich: 21. No issues chewing or swallowing noted. Noted with mild (level 3) pain. Labs reviewed: low BUN (5), creatinine (0.45), phosphorus (1.7), RD concerned about risk of REFEEDING syndrome; medical team is aware. RD to continue to monitor trends. Nutritionally pertinent medications/supplements: senna, zofran, protonix, ducolax. Per nutrition focused physical exam, pt noted with no muscle or fat loss. However, since pt noted weight loss and recent poor PO intakes this admission, per ASPEN guidelines, pt meets criteria for mild malnutrition. Pt amenable to education; RD provided education in regards to the importance of adequate macro and micronutrients, as well as hydration to support ADLs, maintain energy levels and overall functional/nutritional status. General healthful education provided. Nutrient-dense foods promoted. Pt's whipple procedure nutrition education reviewed, low fat, low fiber, low added sugar, softer foods promoted. RD reviewed information from the Nutrition Care Manual, in English and in Chinese. Pt appeared overall receptive and verbalized understanding. No additional nutrition-related concerns. Will continue to follow. Additional nutrition recommendations below to follow.

## 2025-03-31 NOTE — PROGRESS NOTE ADULT - ASSESSMENT
58yo F with PMH of HTN and no significant PSx found to have a 3cm polypoid lesion of the appendiceal orifice on screening colonoscopy with 3cm pancreatic head cyst concerning for neuroendocrine tumor on f/u CT scan. Subsequent EGD/EUS confirmed neuroendocrine pathology. Now s/p open whipple and right hemicolectomy on 3/26.    Plan per primary team   Team 5 will continue to follow

## 2025-03-31 NOTE — DIETITIAN INITIAL EVALUATION ADULT - ADD RECOMMEND
1. Consider Low Fat, Low Fiber Consistent carbohydrate diet  **Provide additional nourishments and/or oral nutrition supplements per pt preferences and/or if pt's PO intakes are consistently <50%**  2. Encourage pt to meet nutritional needs as able  3. Monitor PO intakes, trend weights (weekly), monitor skin integrity, monitor labs (electrolytes, CMP), monitor GI function  4. Encourage adherence to diet education (reinforce as able)  5. Recommend multivitamin and thiamine supplementation   6. Pain and bowel regimen per team  7. Will continue to assess/honor preferences as able   8. Align nutrition interventions with goals of care at all times

## 2025-03-31 NOTE — PROGRESS NOTE ADULT - SUBJECTIVE AND OBJECTIVE BOX
INTERVAL HPI/OVERNIGHT EVENTS: serum amylase 23, nevin fld -n/-v, +f/+bm, +ooba/tc, RLQ drain amylase 229 (35), L SEAN drain amylase: 280 (16), IVHL    STATUS POST:  3/26: s/p open whipple and right hemicolectomy. , IVF 3000,     POST OPERATIVE DAY #: 5    SUBJECTIVE: Pt reports full bowel function. Abdominal pain only when moves around/gets up. PCA used x4 times.    MEDICATIONS  (STANDING):  acetaminophen     Tablet .. 650 milliGRAM(s) Oral every 6 hours  heparin   Injectable 5000 Unit(s) SubCutaneous every 8 hours  HYDROmorphone PCA (1 mG/mL) 30 milliLiter(s) PCA Continuous PCA Continuous  ketorolac   Injectable 15 milliGRAM(s) IV Push every 6 hours  pantoprazole  Injectable 40 milliGRAM(s) IV Push daily    MEDICATIONS  (PRN):  bisacodyl Suppository 10 milliGRAM(s) Rectal daily PRN Constipation  naloxone Injectable 0.1 milliGRAM(s) IV Push every 3 minutes PRN For ANY of the following changes in patient status:  A. RR LESS THAN 10 breaths per minute, B. Oxygen saturation LESS THAN 90%, C. Sedation score of 6  ondansetron Injectable 4 milliGRAM(s) IV Push every 6 hours PRN Nausea  senna 1 Tablet(s) Oral daily PRN Constipation      Vital Signs Last 24 Hrs  T(C): 36.8 (31 Mar 2025 05:22), Max: 37.3 (30 Mar 2025 17:48)  T(F): 98.3 (31 Mar 2025 05:22), Max: 99.2 (30 Mar 2025 17:48)  HR: 78 (31 Mar 2025 04:00) (78 - 90)  BP: 130/74 (31 Mar 2025 04:00) (110/63 - 143/75)  BP(mean): 97 (31 Mar 2025 04:00) (81 - 103)  RR: 18 (31 Mar 2025 04:00) (16 - 18)  SpO2: 96% (31 Mar 2025 04:00) (96% - 98%)    Parameters below as of 31 Mar 2025 04:00  Patient On (Oxygen Delivery Method): room air        PHYSICAL EXAM:      Constitutional: A&Ox3, nad  Respiratory: non labored breathing, no respiratory distress  Cardiovascular: NSR, RRR  Gastrointestinal: abd soft, NT, ND, JPx2 with ss outpt (more serous)                 Incision: C/D/I, island dressing down on AM rounds  Genitourinary: voiding  Extremities: (-) edema, wwp, SCDs in place                  I&O's Detail    29 Mar 2025 07:01  -  30 Mar 2025 07:00  --------------------------------------------------------  IN:    dextrose 5% + sodium chloride 0.45% w/ Additives: 840 mL    Oral Fluid: 150 mL  Total IN: 990 mL    OUT:    Bulb (mL): 45 mL    Bulb (mL): 820 mL    Voided (mL): 725 mL  Total OUT: 1590 mL    Total NET: -600 mL      30 Mar 2025 07:01  -  31 Mar 2025 06:56  --------------------------------------------------------  IN:    dextrose 5% + sodium chloride 0.45% w/ Additives: 378 mL    Oral Fluid: 1125 mL  Total IN: 1503 mL    OUT:    Bulb (mL): 370 mL    Bulb (mL): 10 mL    Voided (mL): 1225 mL  Total OUT: 1605 mL    Total NET: -102 mL          LABS:                        9.6    6.57  )-----------( 195      ( 30 Mar 2025 05:30 )             32.5     03-30    143  |  107  |  5[L]  ----------------------------<  105[H]  3.6   |  26  |  0.45[L]    Ca    8.0[L]      30 Mar 2025 05:30  Phos  1.7     03-30  Mg     2.2     03-30    TPro  5.6[L]  /  Alb  2.9[L]  /  TBili  0.2  /  DBili  x   /  AST  28  /  ALT  30  /  AlkPhos  58  03-30    PT/INR - ( 30 Mar 2025 05:30 )   PT: 11.0 sec;   INR: 0.96          PTT - ( 30 Mar 2025 05:30 )  PTT:30.8 sec  Urinalysis Basic - ( 30 Mar 2025 05:30 )    Color: x / Appearance: x / SG: x / pH: x  Gluc: 105 mg/dL / Ketone: x  / Bili: x / Urobili: x   Blood: x / Protein: x / Nitrite: x   Leuk Esterase: x / RBC: x / WBC x   Sq Epi: x / Non Sq Epi: x / Bacteria: x        RADIOLOGY & ADDITIONAL STUDIES:

## 2025-03-31 NOTE — DIETITIAN INITIAL EVALUATION ADULT - SIGNS/SYMPTOMS
as evidenced by sudden weight loss and poor PO intakes as evidenced by status post open whipple and right hemicolectomy

## 2025-03-31 NOTE — DIETITIAN INITIAL EVALUATION ADULT - OTHER CALCULATIONS
Pt is within ~% ideal body weight, thus actual body weight used for all calculations. Needs adjusted for age, physiological demands, postoperative status.

## 2025-03-31 NOTE — DIETITIAN INITIAL EVALUATION ADULT - PERTINENT MEDS FT
MEDICATIONS  (STANDING):  acetaminophen     Tablet .. 650 milliGRAM(s) Oral every 6 hours  heparin   Injectable 5000 Unit(s) SubCutaneous every 8 hours  ketorolac   Injectable 15 milliGRAM(s) IV Push every 6 hours  pantoprazole  Injectable 40 milliGRAM(s) IV Push daily    MEDICATIONS  (PRN):  bisacodyl Suppository 10 milliGRAM(s) Rectal daily PRN Constipation  naloxone Injectable 0.1 milliGRAM(s) IV Push every 3 minutes PRN For ANY of the following changes in patient status:  A. RR LESS THAN 10 breaths per minute, B. Oxygen saturation LESS THAN 90%, C. Sedation score of 6  ondansetron Injectable 4 milliGRAM(s) IV Push every 6 hours PRN Nausea  oxyCODONE    IR 2.5 milliGRAM(s) Oral every 6 hours PRN Moderate Pain (4 - 6)  oxyCODONE    IR 5 milliGRAM(s) Oral every 6 hours PRN Severe Pain (7 - 10)  senna 1 Tablet(s) Oral daily PRN Constipation

## 2025-03-31 NOTE — PROGRESS NOTE ADULT - SUBJECTIVE AND OBJECTIVE BOX
INTERVAL HPI/OVERNIGHT EVENTS: serum amylase 23, nevin fld -n/-v, +f/+bm, +ooba/tc, RLQ drain amylase 229 (35), L SEAN drain amylase: 280 (16), IVHL    STATUS POST: open whipple and right hemicolectomy    POST OPERATIVE DAY #: 5    SUBJECTIVE: Pt seen and examined by chief resident. Pt is doing well, resting comfortably on bed. Pain controlled. Clear liquid diet tolerated. Having flatus and some soft BMs. No nausea or vomiting. +OOBA No complaints at this time.    MEDICATIONS  (STANDING):  acetaminophen     Tablet .. 650 milliGRAM(s) Oral every 6 hours  heparin   Injectable 5000 Unit(s) SubCutaneous every 8 hours  ketorolac   Injectable 15 milliGRAM(s) IV Push every 6 hours  pantoprazole  Injectable 40 milliGRAM(s) IV Push daily    MEDICATIONS  (PRN):  bisacodyl Suppository 10 milliGRAM(s) Rectal daily PRN Constipation  naloxone Injectable 0.1 milliGRAM(s) IV Push every 3 minutes PRN For ANY of the following changes in patient status:  A. RR LESS THAN 10 breaths per minute, B. Oxygen saturation LESS THAN 90%, C. Sedation score of 6  ondansetron Injectable 4 milliGRAM(s) IV Push every 6 hours PRN Nausea  oxyCODONE    IR 2.5 milliGRAM(s) Oral every 6 hours PRN Moderate Pain (4 - 6)  oxyCODONE    IR 5 milliGRAM(s) Oral every 6 hours PRN Severe Pain (7 - 10)  senna 1 Tablet(s) Oral daily PRN Constipation      Vital Signs Last 24 Hrs  T(C): 36.7 (31 Mar 2025 09:27), Max: 37.3 (30 Mar 2025 17:48)  T(F): 98 (31 Mar 2025 09:27), Max: 99.2 (30 Mar 2025 17:48)  HR: 84 (31 Mar 2025 08:30) (78 - 90)  BP: 158/80 (31 Mar 2025 08:30) (110/63 - 158/80)  BP(mean): 111 (31 Mar 2025 08:30) (81 - 111)  RR: 18 (31 Mar 2025 08:30) (16 - 18)  SpO2: 100% (31 Mar 2025 08:30) (96% - 100%)    Parameters below as of 31 Mar 2025 08:30  Patient On (Oxygen Delivery Method): room air        PHYSICAL EXAM:  General: NAD, resting comfortably in bed  Pulm: Nonlabored breathing, no respiratory distress  Abd: soft, NT/ND. incisions c/d/i.   Drains: R SEAN serosanguinus output, L SEAN scant  Extrem: WWP, no edema, SCDs in place                  I&O's Detail    30 Mar 2025 07:01  -  31 Mar 2025 07:00  --------------------------------------------------------  IN:    dextrose 5% + sodium chloride 0.45% w/ Additives: 378 mL    Oral Fluid: 1125 mL  Total IN: 1503 mL    OUT:    Bulb (mL): 370 mL    Bulb (mL): 10 mL    Voided (mL): 1225 mL  Total OUT: 1605 mL    Total NET: -102 mL      31 Mar 2025 07:01  -  31 Mar 2025 11:02  --------------------------------------------------------  IN:    Oral Fluid: 250 mL  Total IN: 250 mL    OUT:    Bulb (mL): 0 mL    Bulb (mL): 40 mL    Voided (mL): 0 mL  Total OUT: 40 mL    Total NET: 210 mL          LABS:                        9.6    6.57  )-----------( 195      ( 30 Mar 2025 05:30 )             32.5     03-30    143  |  107  |  5[L]  ----------------------------<  105[H]  3.6   |  26  |  0.45[L]    Ca    8.0[L]      30 Mar 2025 05:30  Phos  1.7     03-30  Mg     2.2     03-30    TPro  5.6[L]  /  Alb  2.9[L]  /  TBili  0.2  /  DBili  x   /  AST  28  /  ALT  30  /  AlkPhos  58  03-30    PT/INR - ( 30 Mar 2025 05:30 )   PT: 11.0 sec;   INR: 0.96          PTT - ( 30 Mar 2025 05:30 )  PTT:30.8 sec  Urinalysis Basic - ( 30 Mar 2025 05:30 )    Color: x / Appearance: x / SG: x / pH: x  Gluc: 105 mg/dL / Ketone: x  / Bili: x / Urobili: x   Blood: x / Protein: x / Nitrite: x   Leuk Esterase: x / RBC: x / WBC x   Sq Epi: x / Non Sq Epi: x / Bacteria: x        RADIOLOGY & ADDITIONAL STUDIES:

## 2025-03-31 NOTE — DIETITIAN INITIAL EVALUATION ADULT - PERTINENT LABORATORY DATA
03-31    137  |  98  |  4[L]  ----------------------------<  141[H]  3.4[L]   |  27  |  0.51    Ca    8.3[L]      31 Mar 2025 10:00  Phos  2.2     03-31  Mg     2.0     03-31    TPro  5.6[L]  /  Alb  2.9[L]  /  TBili  0.2  /  DBili  x   /  AST  28  /  ALT  30  /  AlkPhos  58  03-30  POCT Blood Glucose.: 116 mg/dL (03-31-25 @ 11:29)

## 2025-03-31 NOTE — DIETITIAN NUTRITION RISK NOTIFICATION - ADDITIONAL COMMENTS/DIETITIAN RECOMMENDATIONS
Pt seen in room for nutrition assessment. Pt speaks Thomas,  utilized, ID number [id number] (Gely). Pt noted with fair to low appetite PTA and low appetite during hospital stay. As per diet recall PTA: pt was eating small portion meals, including rice, fish, meat, vegetables, fats/oils, etc. Currently on regular diet, tolerating variably, some fair, some poor PO intakes, </=50% overall. Pt's diet was just advanced this morning to regular diet solid foods (previously on full liquids diet 3/30/25). No cultural, Scientologist, or ethnic food preferences noted. No known food allergies. No supplements (micronutrient, oral nutrition supplements) at home noted. Pt noted with some wt changes, reports some weight loss, unsure of the exact amount. Pt stated her usual body weight was ~120 pounds. Dosing wt: ~110.5 pounds, Ideal body weight: ~120 pounds, pt is ~92% of ideal body weight. No noted major signs/symptoms of nausea, vomiting, pt noted she has had some loose stool today, RD notified pt's nurse. No documented edema per nursing. Skin: right and left SEAN drains noted; no pressure ulcers noted. Rich: 21. No issues chewing or swallowing noted. Noted with mild (level 3) pain. Labs reviewed: low BUN (5), creatinine (0.45), phosphorus (1.7), RD concerned about risk of REFEEDING syndrome; medical team is aware. RD to continue to monitor trends. Nutritionally pertinent medications/supplements: senna, zofran, protonix, ducolax. Per nutrition focused physical exam, pt noted with no muscle or fat loss. However, since pt noted weight loss and recent poor PO intakes this admission, per ASPEN guidelines, pt meets criteria for mild malnutrition. Pt amenable to education; RD provided education in regards to the importance of adequate macro and micronutrients, as well as hydration to support ADLs, maintain energy levels and overall functional/nutritional status. General healthful education provided. Nutrient-dense foods promoted. Pt's whipple procedure nutrition education reviewed, low fat, low fiber, low added sugar, softer foods promoted. RD reviewed information from the Nutrition Care Manual, in English and in Chinese.     Nutrition Recommendations:   1. Consider Low Fat, Low Fiber Consistent carbohydrate diet  **Provide additional nourishments and/or oral nutrition supplements per pt preferences and/or if pt's PO intakes are consistently <50%**  2. Encourage pt to meet nutritional needs as able  3. Monitor PO intakes, trend weights (weekly), monitor skin integrity, monitor labs (electrolytes, CMP), monitor GI function  4. Encourage adherence to diet education (reinforce as able)  5. Recommend multivitamin and thiamine supplementation   6. Pain and bowel regimen per team  7. Will continue to assess/honor preferences as able   8. Align nutrition interventions with goals of care at all times     Lucille Tejeda, GHAZALAN, CDN, ext [phone], also available via TEAMS

## 2025-03-31 NOTE — DIETITIAN INITIAL EVALUATION ADULT - PERSON TAUGHT/METHOD
Pt amenable to education; RD provided education in regards to the importance of adequate macro and micronutrients, as well as hydration to support ADLs, maintain energy levels and overall functional/nutritional status. General healthful education provided. Nutrient-dense foods promoted. Pt's whipple procedure nutrition education reviewed, low fat, low fiber, low added sugar, softer foods promoted. RD reviewed information from the Nutrition Care Manual, in English and in Chinese. Pt appeared overall receptive and verbalized understanding./verbal instruction/written material/patient instructed

## 2025-04-01 LAB
AMYLASE FLD-CCNC: 20 U/L — SIGNIFICANT CHANGE UP
ANION GAP SERPL CALC-SCNC: 10 MMOL/L — SIGNIFICANT CHANGE UP (ref 5–17)
BUN SERPL-MCNC: 5 MG/DL — LOW (ref 7–23)
CALCIUM SERPL-MCNC: 8.4 MG/DL — SIGNIFICANT CHANGE UP (ref 8.4–10.5)
CHLORIDE SERPL-SCNC: 106 MMOL/L — SIGNIFICANT CHANGE UP (ref 96–108)
CO2 SERPL-SCNC: 25 MMOL/L — SIGNIFICANT CHANGE UP (ref 22–31)
CREAT SERPL-MCNC: 0.42 MG/DL — LOW (ref 0.5–1.3)
EGFR: 113 ML/MIN/1.73M2 — SIGNIFICANT CHANGE UP
EGFR: 113 ML/MIN/1.73M2 — SIGNIFICANT CHANGE UP
GLUCOSE SERPL-MCNC: 95 MG/DL — SIGNIFICANT CHANGE UP (ref 70–99)
HCT VFR BLD CALC: 30.3 % — LOW (ref 34.5–45)
HGB BLD-MCNC: 9.5 G/DL — LOW (ref 11.5–15.5)
MAGNESIUM SERPL-MCNC: 2.1 MG/DL — SIGNIFICANT CHANGE UP (ref 1.6–2.6)
MCHC RBC-ENTMCNC: 23.2 PG — LOW (ref 27–34)
MCHC RBC-ENTMCNC: 31.4 G/DL — LOW (ref 32–36)
MCV RBC AUTO: 74.1 FL — LOW (ref 80–100)
NRBC BLD AUTO-RTO: 0 /100 WBCS — SIGNIFICANT CHANGE UP (ref 0–0)
PHOSPHATE SERPL-MCNC: 3.1 MG/DL — SIGNIFICANT CHANGE UP (ref 2.5–4.5)
PLATELET # BLD AUTO: 236 K/UL — SIGNIFICANT CHANGE UP (ref 150–400)
POTASSIUM SERPL-MCNC: 3.6 MMOL/L — SIGNIFICANT CHANGE UP (ref 3.5–5.3)
POTASSIUM SERPL-SCNC: 3.6 MMOL/L — SIGNIFICANT CHANGE UP (ref 3.5–5.3)
RBC # BLD: 4.09 M/UL — SIGNIFICANT CHANGE UP (ref 3.8–5.2)
RBC # FLD: 13.5 % — SIGNIFICANT CHANGE UP (ref 10.3–14.5)
SODIUM SERPL-SCNC: 141 MMOL/L — SIGNIFICANT CHANGE UP (ref 135–145)
WBC # BLD: 5.21 K/UL — SIGNIFICANT CHANGE UP (ref 3.8–10.5)
WBC # FLD AUTO: 5.21 K/UL — SIGNIFICANT CHANGE UP (ref 3.8–10.5)

## 2025-04-01 RX ORDER — GABAPENTIN 400 MG/1
100 CAPSULE ORAL EVERY 8 HOURS
Refills: 0 | Status: DISCONTINUED | OUTPATIENT
Start: 2025-04-01 | End: 2025-04-07

## 2025-04-01 RX ORDER — IBUPROFEN 200 MG
400 TABLET ORAL EVERY 6 HOURS
Refills: 0 | Status: DISCONTINUED | OUTPATIENT
Start: 2025-04-01 | End: 2025-04-07

## 2025-04-01 RX ORDER — HYDROMORPHONE/SOD CHLOR,ISO/PF 2 MG/10 ML
0.5 SYRINGE (ML) INJECTION ONCE
Refills: 0 | Status: DISCONTINUED | OUTPATIENT
Start: 2025-04-01 | End: 2025-04-01

## 2025-04-01 RX ORDER — ONDANSETRON HCL/PF 4 MG/2 ML
4 VIAL (ML) INJECTION ONCE
Refills: 0 | Status: COMPLETED | OUTPATIENT
Start: 2025-04-01 | End: 2025-04-01

## 2025-04-01 RX ADMIN — Medication 400 MILLIGRAM(S): at 17:20

## 2025-04-01 RX ADMIN — Medication 400 MILLIGRAM(S): at 18:21

## 2025-04-01 RX ADMIN — Medication 650 MILLIGRAM(S): at 06:06

## 2025-04-01 RX ADMIN — Medication 0.5 MILLIGRAM(S): at 23:52

## 2025-04-01 RX ADMIN — Medication 650 MILLIGRAM(S): at 12:25

## 2025-04-01 RX ADMIN — Medication 40 MILLIGRAM(S): at 12:25

## 2025-04-01 RX ADMIN — HEPARIN SODIUM 5000 UNIT(S): 1000 INJECTION INTRAVENOUS; SUBCUTANEOUS at 21:45

## 2025-04-01 RX ADMIN — KETOROLAC TROMETHAMINE 15 MILLIGRAM(S): 30 INJECTION, SOLUTION INTRAMUSCULAR; INTRAVENOUS at 05:06

## 2025-04-01 RX ADMIN — Medication 650 MILLIGRAM(S): at 00:31

## 2025-04-01 RX ADMIN — OXYCODONE HYDROCHLORIDE 2.5 MILLIGRAM(S): 30 TABLET ORAL at 14:17

## 2025-04-01 RX ADMIN — Medication 1 TABLET(S): at 19:24

## 2025-04-01 RX ADMIN — Medication 650 MILLIGRAM(S): at 05:06

## 2025-04-01 RX ADMIN — KETOROLAC TROMETHAMINE 15 MILLIGRAM(S): 30 INJECTION, SOLUTION INTRAMUSCULAR; INTRAVENOUS at 00:31

## 2025-04-01 RX ADMIN — OXYCODONE HYDROCHLORIDE 2.5 MILLIGRAM(S): 30 TABLET ORAL at 07:59

## 2025-04-01 RX ADMIN — HEPARIN SODIUM 5000 UNIT(S): 1000 INJECTION INTRAVENOUS; SUBCUTANEOUS at 05:06

## 2025-04-01 RX ADMIN — HEPARIN SODIUM 5000 UNIT(S): 1000 INJECTION INTRAVENOUS; SUBCUTANEOUS at 14:17

## 2025-04-01 RX ADMIN — GABAPENTIN 100 MILLIGRAM(S): 400 CAPSULE ORAL at 21:45

## 2025-04-01 RX ADMIN — OXYCODONE HYDROCHLORIDE 2.5 MILLIGRAM(S): 30 TABLET ORAL at 15:46

## 2025-04-01 RX ADMIN — Medication 400 MILLIGRAM(S): at 12:25

## 2025-04-01 RX ADMIN — Medication 650 MILLIGRAM(S): at 18:21

## 2025-04-01 RX ADMIN — Medication 400 MILLIGRAM(S): at 14:08

## 2025-04-01 RX ADMIN — OXYCODONE HYDROCHLORIDE 2.5 MILLIGRAM(S): 30 TABLET ORAL at 06:59

## 2025-04-01 RX ADMIN — Medication 4 MILLIGRAM(S): at 23:52

## 2025-04-01 RX ADMIN — Medication 650 MILLIGRAM(S): at 14:08

## 2025-04-01 RX ADMIN — Medication 650 MILLIGRAM(S): at 17:20

## 2025-04-01 RX ADMIN — GABAPENTIN 100 MILLIGRAM(S): 400 CAPSULE ORAL at 08:02

## 2025-04-01 RX ADMIN — Medication 20 MILLIEQUIVALENT(S): at 08:02

## 2025-04-01 RX ADMIN — Medication 650 MILLIGRAM(S): at 23:52

## 2025-04-01 RX ADMIN — KETOROLAC TROMETHAMINE 15 MILLIGRAM(S): 30 INJECTION, SOLUTION INTRAMUSCULAR; INTRAVENOUS at 06:06

## 2025-04-01 RX ADMIN — Medication 4 MILLIGRAM(S): at 19:24

## 2025-04-01 NOTE — PROGRESS NOTE ADULT - SUBJECTIVE AND OBJECTIVE BOX
INTERVAL HPI/OVERNIGHT EVENTS: nevin reg with min nausea, -v, pain controlled, +f/+bm    STATUS POST:  open whipple, R jessika    POST OPERATIVE DAY #: 6    SUBJECTIVE: Pt seen and examined by chief resident. Pt is doing well, resting comfortably on bed. Pain controlled. Low res diet tolerated. Ambulating out of bed. +F/+BM. No nausea or vomiting. No complaints at this time.    MEDICATIONS  (STANDING):  acetaminophen     Tablet .. 650 milliGRAM(s) Oral every 6 hours  heparin   Injectable 5000 Unit(s) SubCutaneous every 8 hours  ibuprofen  Tablet. 400 milliGRAM(s) Oral every 6 hours  pantoprazole  Injectable 40 milliGRAM(s) IV Push daily    MEDICATIONS  (PRN):  bisacodyl Suppository 10 milliGRAM(s) Rectal daily PRN Constipation  naloxone Injectable 0.1 milliGRAM(s) IV Push every 3 minutes PRN For ANY of the following changes in patient status:  A. RR LESS THAN 10 breaths per minute, B. Oxygen saturation LESS THAN 90%, C. Sedation score of 6  ondansetron Injectable 4 milliGRAM(s) IV Push every 6 hours PRN Nausea  oxyCODONE    IR 2.5 milliGRAM(s) Oral every 6 hours PRN Moderate Pain (4 - 6)  oxyCODONE    IR 5 milliGRAM(s) Oral every 6 hours PRN Severe Pain (7 - 10)  senna 1 Tablet(s) Oral daily PRN Constipation      Vital Signs Last 24 Hrs  T(C): 36.7 (01 Apr 2025 04:47), Max: 37.3 (31 Mar 2025 14:49)  T(F): 98 (01 Apr 2025 04:47), Max: 99.1 (31 Mar 2025 14:49)  HR: 82 (01 Apr 2025 04:07) (82 - 90)  BP: 155/83 (01 Apr 2025 04:07) (130/65 - 158/80)  BP(mean): 112 (01 Apr 2025 04:07) (92 - 112)  RR: 16 (01 Apr 2025 04:07) (16 - 18)  SpO2: 97% (01 Apr 2025 04:07) (95% - 100%)    Parameters below as of 01 Apr 2025 04:07  Patient On (Oxygen Delivery Method): room air        PHYSICAL EXAM:  Constitutional: A&Ox3, nad  Respiratory: non labored breathing, no respiratory distress  Cardiovascular: NSR, RRR  Gastrointestinal: abd soft, mildly TTP in all 4 quads, ND, R SEAN with serous outpt                 Incision: midline C/D/I  Genitourinary: voiding  Extremities: (-) edema, wwp, SCDs in place                    I&O's Detail    31 Mar 2025 07:01  -  01 Apr 2025 07:00  --------------------------------------------------------  IN:    Oral Fluid: 750 mL  Total IN: 750 mL    OUT:    Bulb (mL): 0 mL    Bulb (mL): 170 mL    Voided (mL): 800 mL  Total OUT: 970 mL    Total NET: -220 mL          LABS:                        10.0   5.31  )-----------( 222      ( 31 Mar 2025 10:00 )             33.3     03-31    137  |  98  |  4[L]  ----------------------------<  141[H]  3.4[L]   |  27  |  0.51    Ca    8.3[L]      31 Mar 2025 10:00  Phos  2.2     03-31  Mg     2.0     03-31        Urinalysis Basic - ( 31 Mar 2025 10:00 )    Color: x / Appearance: x / SG: x / pH: x  Gluc: 141 mg/dL / Ketone: x  / Bili: x / Urobili: x   Blood: x / Protein: x / Nitrite: x   Leuk Esterase: x / RBC: x / WBC x   Sq Epi: x / Non Sq Epi: x / Bacteria: x        RADIOLOGY & ADDITIONAL STUDIES:

## 2025-04-01 NOTE — PROGRESS NOTE ADULT - ASSESSMENT
58yo F with PMH of HTN and no significant PSx found to have a 3cm polypoid lesion of the appendiceal orifice on screening colonoscopy with 3cm pancreatic head cyst concerning for neuroendocrine tumor on f/u CT scan. Subsequent EGD/EUS confirmed neuroendocrine pathology. Now s/p open whipple and right hemicolectomy on 3/26.    Reg  Whipple Pathway  Pain and nausea control  R SEAN drain   Protonix 40mg IV qd  HSQ/SCDs/IS/OOBA  AM labs

## 2025-04-01 NOTE — PROGRESS NOTE ADULT - SUBJECTIVE AND OBJECTIVE BOX
INTERVAL HPI/OVERNIGHT EVENTS: nevin reg with min nausea, -v, pain controlled, +f/+bm    STATUS POST:  3/26: s/p open whipple and right hemicolectomy. , IVF 3000,     POST OPERATIVE DAY #: 6    SUBJECTIVE:     MEDICATIONS  (STANDING):  acetaminophen     Tablet .. 650 milliGRAM(s) Oral every 6 hours  heparin   Injectable 5000 Unit(s) SubCutaneous every 8 hours  ketorolac   Injectable 15 milliGRAM(s) IV Push every 6 hours  pantoprazole  Injectable 40 milliGRAM(s) IV Push daily    MEDICATIONS  (PRN):  bisacodyl Suppository 10 milliGRAM(s) Rectal daily PRN Constipation  naloxone Injectable 0.1 milliGRAM(s) IV Push every 3 minutes PRN For ANY of the following changes in patient status:  A. RR LESS THAN 10 breaths per minute, B. Oxygen saturation LESS THAN 90%, C. Sedation score of 6  ondansetron Injectable 4 milliGRAM(s) IV Push every 6 hours PRN Nausea  oxyCODONE    IR 2.5 milliGRAM(s) Oral every 6 hours PRN Moderate Pain (4 - 6)  oxyCODONE    IR 5 milliGRAM(s) Oral every 6 hours PRN Severe Pain (7 - 10)  senna 1 Tablet(s) Oral daily PRN Constipation      Vital Signs Last 24 Hrs  T(C): 36.7 (01 Apr 2025 04:47), Max: 37.3 (31 Mar 2025 14:49)  T(F): 98 (01 Apr 2025 04:47), Max: 99.1 (31 Mar 2025 14:49)  HR: 82 (01 Apr 2025 04:07) (82 - 90)  BP: 155/83 (01 Apr 2025 04:07) (130/65 - 158/80)  BP(mean): 112 (01 Apr 2025 04:07) (92 - 112)  RR: 16 (01 Apr 2025 04:07) (16 - 18)  SpO2: 97% (01 Apr 2025 04:07) (95% - 100%)    Parameters below as of 01 Apr 2025 04:07  Patient On (Oxygen Delivery Method): room air        PHYSICAL EXAM:      Constitutional: A&Ox3, nad  Respiratory: non labored breathing, no respiratory distress  Cardiovascular: NSR, RRR  Gastrointestinal:                  Incision:  Genitourinary: voiding  Extremities: (-) edema, wwp, SCDs in place                  I&O's Detail    30 Mar 2025 07:01  -  31 Mar 2025 07:00  --------------------------------------------------------  IN:    dextrose 5% + sodium chloride 0.45% w/ Additives: 378 mL    Oral Fluid: 1125 mL  Total IN: 1503 mL    OUT:    Bulb (mL): 370 mL    Bulb (mL): 10 mL    Voided (mL): 1225 mL  Total OUT: 1605 mL    Total NET: -102 mL      31 Mar 2025 07:01  -  01 Apr 2025 06:17  --------------------------------------------------------  IN:    Oral Fluid: 750 mL  Total IN: 750 mL    OUT:    Bulb (mL): 0 mL    Bulb (mL): 170 mL    Voided (mL): 800 mL  Total OUT: 970 mL    Total NET: -220 mL          LABS:                        10.0   5.31  )-----------( 222      ( 31 Mar 2025 10:00 )             33.3     03-31    137  |  98  |  4[L]  ----------------------------<  141[H]  3.4[L]   |  27  |  0.51    Ca    8.3[L]      31 Mar 2025 10:00  Phos  2.2     03-31  Mg     2.0     03-31        Urinalysis Basic - ( 31 Mar 2025 10:00 )    Color: x / Appearance: x / SG: x / pH: x  Gluc: 141 mg/dL / Ketone: x  / Bili: x / Urobili: x   Blood: x / Protein: x / Nitrite: x   Leuk Esterase: x / RBC: x / WBC x   Sq Epi: x / Non Sq Epi: x / Bacteria: x        RADIOLOGY & ADDITIONAL STUDIES: INTERVAL HPI/OVERNIGHT EVENTS: nevin reg with min nausea, -v, pain controlled, +f/+bm    STATUS POST:  3/26: s/p open whipple and right hemicolectomy. , IVF 3000,     POST OPERATIVE DAY #: 6    SUBJECTIVE: Patient reports mild to moderate abdominal discomfort most likely secondary to minimal use of pain medications. Passing flatus, and having BMs. Tolerating diet. +OOBA/OOBC.    MEDICATIONS  (STANDING):  acetaminophen     Tablet .. 650 milliGRAM(s) Oral every 6 hours  heparin   Injectable 5000 Unit(s) SubCutaneous every 8 hours  ketorolac   Injectable 15 milliGRAM(s) IV Push every 6 hours  pantoprazole  Injectable 40 milliGRAM(s) IV Push daily    MEDICATIONS  (PRN):  bisacodyl Suppository 10 milliGRAM(s) Rectal daily PRN Constipation  naloxone Injectable 0.1 milliGRAM(s) IV Push every 3 minutes PRN For ANY of the following changes in patient status:  A. RR LESS THAN 10 breaths per minute, B. Oxygen saturation LESS THAN 90%, C. Sedation score of 6  ondansetron Injectable 4 milliGRAM(s) IV Push every 6 hours PRN Nausea  oxyCODONE    IR 2.5 milliGRAM(s) Oral every 6 hours PRN Moderate Pain (4 - 6)  oxyCODONE    IR 5 milliGRAM(s) Oral every 6 hours PRN Severe Pain (7 - 10)  senna 1 Tablet(s) Oral daily PRN Constipation      Vital Signs Last 24 Hrs  T(C): 36.7 (01 Apr 2025 04:47), Max: 37.3 (31 Mar 2025 14:49)  T(F): 98 (01 Apr 2025 04:47), Max: 99.1 (31 Mar 2025 14:49)  HR: 82 (01 Apr 2025 04:07) (82 - 90)  BP: 155/83 (01 Apr 2025 04:07) (130/65 - 158/80)  BP(mean): 112 (01 Apr 2025 04:07) (92 - 112)  RR: 16 (01 Apr 2025 04:07) (16 - 18)  SpO2: 97% (01 Apr 2025 04:07) (95% - 100%)    Parameters below as of 01 Apr 2025 04:07  Patient On (Oxygen Delivery Method): room air        PHYSICAL EXAM:      Constitutional: A&Ox3, nad  Respiratory: non labored breathing, no respiratory distress  Cardiovascular: NSR, RRR  Gastrointestinal: abd soft, mildly TTP in all 4 quads, ND, R SEAN with serous outpt                 Incision: midline C/D/I  Genitourinary: voiding  Extremities: (-) edema, wwp, SCDs in place                  I&O's Detail    30 Mar 2025 07:01  -  31 Mar 2025 07:00  --------------------------------------------------------  IN:    dextrose 5% + sodium chloride 0.45% w/ Additives: 378 mL    Oral Fluid: 1125 mL  Total IN: 1503 mL    OUT:    Bulb (mL): 370 mL    Bulb (mL): 10 mL    Voided (mL): 1225 mL  Total OUT: 1605 mL    Total NET: -102 mL      31 Mar 2025 07:01  -  01 Apr 2025 06:17  --------------------------------------------------------  IN:    Oral Fluid: 750 mL  Total IN: 750 mL    OUT:    Bulb (mL): 0 mL    Bulb (mL): 170 mL    Voided (mL): 800 mL  Total OUT: 970 mL    Total NET: -220 mL          LABS:                        10.0   5.31  )-----------( 222      ( 31 Mar 2025 10:00 )             33.3     03-31    137  |  98  |  4[L]  ----------------------------<  141[H]  3.4[L]   |  27  |  0.51    Ca    8.3[L]      31 Mar 2025 10:00  Phos  2.2     03-31  Mg     2.0     03-31        Urinalysis Basic - ( 31 Mar 2025 10:00 )    Color: x / Appearance: x / SG: x / pH: x  Gluc: 141 mg/dL / Ketone: x  / Bili: x / Urobili: x   Blood: x / Protein: x / Nitrite: x   Leuk Esterase: x / RBC: x / WBC x   Sq Epi: x / Non Sq Epi: x / Bacteria: x        RADIOLOGY & ADDITIONAL STUDIES: None

## 2025-04-01 NOTE — PROGRESS NOTE ADULT - ASSESSMENT
60yo F with PMH of HTN and no significant PSx found to have a 3cm polypoid lesion of the appendiceal orifice on screening colonoscopy with 3cm pancreatic head cyst concerning for neuroendocrine tumor on f/u CT scan. Subsequent EGD/EUS confirmed neuroendocrine pathology. Now s/p open whipple and right hemicolectomy on 3/26. Now having bowel function.     Care per primary team  Team 5 will continue to follow

## 2025-04-02 LAB
ANION GAP SERPL CALC-SCNC: 11 MMOL/L — SIGNIFICANT CHANGE UP (ref 5–17)
BUN SERPL-MCNC: 13 MG/DL — SIGNIFICANT CHANGE UP (ref 7–23)
CALCIUM SERPL-MCNC: 8.3 MG/DL — LOW (ref 8.4–10.5)
CHLORIDE SERPL-SCNC: 101 MMOL/L — SIGNIFICANT CHANGE UP (ref 96–108)
CO2 SERPL-SCNC: 24 MMOL/L — SIGNIFICANT CHANGE UP (ref 22–31)
CREAT SERPL-MCNC: 0.45 MG/DL — LOW (ref 0.5–1.3)
EGFR: 111 ML/MIN/1.73M2 — SIGNIFICANT CHANGE UP
EGFR: 111 ML/MIN/1.73M2 — SIGNIFICANT CHANGE UP
GLUCOSE SERPL-MCNC: 125 MG/DL — HIGH (ref 70–99)
HCT VFR BLD CALC: 31.4 % — LOW (ref 34.5–45)
HGB BLD-MCNC: 9.7 G/DL — LOW (ref 11.5–15.5)
MAGNESIUM SERPL-MCNC: 1.7 MG/DL — SIGNIFICANT CHANGE UP (ref 1.6–2.6)
MCHC RBC-ENTMCNC: 23.6 PG — LOW (ref 27–34)
MCHC RBC-ENTMCNC: 30.9 G/DL — LOW (ref 32–36)
MCV RBC AUTO: 76.4 FL — LOW (ref 80–100)
NRBC BLD AUTO-RTO: 0 /100 WBCS — SIGNIFICANT CHANGE UP (ref 0–0)
PHOSPHATE SERPL-MCNC: 3.7 MG/DL — SIGNIFICANT CHANGE UP (ref 2.5–4.5)
PLATELET # BLD AUTO: 286 K/UL — SIGNIFICANT CHANGE UP (ref 150–400)
POTASSIUM SERPL-MCNC: 3.7 MMOL/L — SIGNIFICANT CHANGE UP (ref 3.5–5.3)
POTASSIUM SERPL-SCNC: 3.7 MMOL/L — SIGNIFICANT CHANGE UP (ref 3.5–5.3)
RBC # BLD: 4.11 M/UL — SIGNIFICANT CHANGE UP (ref 3.8–5.2)
RBC # FLD: 13.4 % — SIGNIFICANT CHANGE UP (ref 10.3–14.5)
SODIUM SERPL-SCNC: 136 MMOL/L — SIGNIFICANT CHANGE UP (ref 135–145)
WBC # BLD: 17.02 K/UL — HIGH (ref 3.8–10.5)
WBC # FLD AUTO: 17.02 K/UL — HIGH (ref 3.8–10.5)

## 2025-04-02 PROCEDURE — 74177 CT ABD & PELVIS W/CONTRAST: CPT | Mod: 26

## 2025-04-02 RX ORDER — PIPERACILLIN-TAZO-DEXTROSE,ISO 3.375G/5
3.38 IV SOLUTION, PIGGYBACK PREMIX FROZEN(ML) INTRAVENOUS ONCE
Refills: 0 | Status: COMPLETED | OUTPATIENT
Start: 2025-04-02 | End: 2025-04-02

## 2025-04-02 RX ORDER — MAGNESIUM SULFATE 500 MG/ML
1 SYRINGE (ML) INJECTION ONCE
Refills: 0 | Status: COMPLETED | OUTPATIENT
Start: 2025-04-02 | End: 2025-04-02

## 2025-04-02 RX ORDER — PIPERACILLIN-TAZO-DEXTROSE,ISO 3.375G/5
3.38 IV SOLUTION, PIGGYBACK PREMIX FROZEN(ML) INTRAVENOUS EVERY 8 HOURS
Refills: 0 | Status: DISCONTINUED | OUTPATIENT
Start: 2025-04-03 | End: 2025-04-04

## 2025-04-02 RX ORDER — IOHEXOL 350 MG/ML
30 INJECTION, SOLUTION INTRAVENOUS ONCE
Refills: 0 | Status: COMPLETED | OUTPATIENT
Start: 2025-04-02 | End: 2025-04-02

## 2025-04-02 RX ORDER — PIPERACILLIN-TAZO-DEXTROSE,ISO 3.375G/5
3.38 IV SOLUTION, PIGGYBACK PREMIX FROZEN(ML) INTRAVENOUS ONCE
Refills: 0 | Status: COMPLETED | OUTPATIENT
Start: 2025-04-03 | End: 2025-04-02

## 2025-04-02 RX ADMIN — Medication 650 MILLIGRAM(S): at 13:00

## 2025-04-02 RX ADMIN — OXYCODONE HYDROCHLORIDE 5 MILLIGRAM(S): 30 TABLET ORAL at 06:34

## 2025-04-02 RX ADMIN — HEPARIN SODIUM 5000 UNIT(S): 1000 INJECTION INTRAVENOUS; SUBCUTANEOUS at 05:35

## 2025-04-02 RX ADMIN — GABAPENTIN 100 MILLIGRAM(S): 400 CAPSULE ORAL at 15:10

## 2025-04-02 RX ADMIN — GABAPENTIN 100 MILLIGRAM(S): 400 CAPSULE ORAL at 05:34

## 2025-04-02 RX ADMIN — Medication 200 GRAM(S): at 15:12

## 2025-04-02 RX ADMIN — IOHEXOL 30 MILLILITER(S): 350 INJECTION, SOLUTION INTRAVENOUS at 07:13

## 2025-04-02 RX ADMIN — Medication 400 MILLIGRAM(S): at 03:37

## 2025-04-02 RX ADMIN — Medication 650 MILLIGRAM(S): at 12:37

## 2025-04-02 RX ADMIN — Medication 650 MILLIGRAM(S): at 17:15

## 2025-04-02 RX ADMIN — OXYCODONE HYDROCHLORIDE 5 MILLIGRAM(S): 30 TABLET ORAL at 05:34

## 2025-04-02 RX ADMIN — Medication 20 MILLIEQUIVALENT(S): at 10:03

## 2025-04-02 RX ADMIN — Medication 0.5 MILLIGRAM(S): at 00:22

## 2025-04-02 RX ADMIN — Medication 650 MILLIGRAM(S): at 23:31

## 2025-04-02 RX ADMIN — Medication 650 MILLIGRAM(S): at 06:34

## 2025-04-02 RX ADMIN — Medication 400 MILLIGRAM(S): at 10:04

## 2025-04-02 RX ADMIN — GABAPENTIN 100 MILLIGRAM(S): 400 CAPSULE ORAL at 21:06

## 2025-04-02 RX ADMIN — Medication 400 MILLIGRAM(S): at 10:39

## 2025-04-02 RX ADMIN — Medication 650 MILLIGRAM(S): at 05:34

## 2025-04-02 RX ADMIN — Medication 400 MILLIGRAM(S): at 15:45

## 2025-04-02 RX ADMIN — Medication 100 GRAM(S): at 10:03

## 2025-04-02 RX ADMIN — HEPARIN SODIUM 5000 UNIT(S): 1000 INJECTION INTRAVENOUS; SUBCUTANEOUS at 15:10

## 2025-04-02 RX ADMIN — Medication 400 MILLIGRAM(S): at 15:11

## 2025-04-02 RX ADMIN — Medication 25 GRAM(S): at 17:14

## 2025-04-02 RX ADMIN — Medication 400 MILLIGRAM(S): at 21:06

## 2025-04-02 RX ADMIN — Medication 25 GRAM(S): at 23:31

## 2025-04-02 RX ADMIN — Medication 400 MILLIGRAM(S): at 22:06

## 2025-04-02 RX ADMIN — Medication 650 MILLIGRAM(S): at 17:45

## 2025-04-02 RX ADMIN — Medication 400 MILLIGRAM(S): at 02:37

## 2025-04-02 RX ADMIN — Medication 40 MILLIGRAM(S): at 12:37

## 2025-04-02 RX ADMIN — Medication 650 MILLIGRAM(S): at 00:22

## 2025-04-02 RX ADMIN — HEPARIN SODIUM 5000 UNIT(S): 1000 INJECTION INTRAVENOUS; SUBCUTANEOUS at 21:06

## 2025-04-02 NOTE — PROGRESS NOTE ADULT - ASSESSMENT
60yo F with PMH of HTN and no significant PSx found to have a 3cm polypoid lesion of the appendiceal orifice on screening colonoscopy with 3cm pancreatic head cyst concerning for neuroendocrine tumor on f/u CT scan. Subsequent EGD/EUS confirmed neuroendocrine pathology. Now s/p open whipple and right hemicolectomy on 3/26.    LFD + ensures  Whipple Pathway  Pain and nausea control  R SEAN drain   Protonix 40mg IV qd  HSQ/SCDs/IS/OOBA  AM labs  Dispo: VNS? 60yo F with PMH of HTN and no significant PSx found to have a 3cm polypoid lesion of the appendiceal orifice on screening colonoscopy with 3cm pancreatic head cyst concerning for neuroendocrine tumor on f/u CT scan. Subsequent EGD/EUS confirmed neuroendocrine pathology. Now s/p open whipple and right hemicolectomy on 3/26.    CTAP PO/IV  LFD + ensures  Whipple Pathway  Pain and nausea control  R SEAN drain   Protonix 40mg IV qd  HSQ/SCDs/IS/OOBA  AM labs  Dispo: [facility]

## 2025-04-02 NOTE — PROVIDER CONTACT NOTE (OTHER) - RECOMMENDATIONS
Notify provider.
Per MD order.
This RN stopped D5% 1/2 NS w/ K+ rider and asked MIHIR Alvarez to consider midline/ultrasound-guided IV in AM for fluids and med admin.

## 2025-04-02 NOTE — PROVIDER CONTACT NOTE (OTHER) - BACKGROUND
58 y/o female with PMH of HTN, presents with neuroendocrine tumor of CT scan. Subsequent EGD/EUS confirmed neuroendocrine pathology. Now s/p open whipple and right hemicolectomy on 3/26.
S/P omar (3/26). PCA and D5% 1/2 NS w/ K+ rider ordered.
Pt s/p open whipple and right hemicolectomy

## 2025-04-02 NOTE — PROVIDER CONTACT NOTE (OTHER) - ASSESSMENT
L 16g infiltrated and removed; LUE edematous and unable to place new IV. R forearm 22g infiltrated; removed. New 22g placed in R hand - D5% 1/2 NS w/ K+ rider running through this line. R 22g in AC placed for PCA; became infiltrated and was removed. PCA line switched to R 22g in hand. IVF stopped d/t incompatibility w/ PCA setup.
Patients other VSS, HR 90, RR 16, SPO2 95 on RA, NSR. Patient denies any dizziness or headache at this time, no SOB or . Patient verbalizes wanting to walk and use the bathroom, no s/s of hypotension noted.
Pt ambulated to bathroom, had loose bowel movement and then endorsed feeling weak and unable to keep her head up. Patient safely ambulated back to bed. Orthostatics done, patient negative, FS done, not hypoglycemic, VS per flowsheets

## 2025-04-02 NOTE — PROGRESS NOTE ADULT - ASSESSMENT
60yo F with PMH of HTN and no significant PSx found to have a 3cm polypoid lesion of the appendiceal orifice on screening colonoscopy with 3cm pancreatic head cyst concerning for neuroendocrine tumor on f/u CT scan. Subsequent EGD/EUS confirmed neuroendocrine pathology. Now s/p open whipple and right hemicolectomy on 3/26.    F/u pain control   Rest of care per primary team

## 2025-04-02 NOTE — PROGRESS NOTE ADULT - SUBJECTIVE AND OBJECTIVE BOX
POST-OP DAY: #8 s/p open whipple, R hemicolectomy      SUBJECTIVE: Patient seen and examined bedside by chief resident on AM rounds. Pt reports persistent abdominal pain and mild abdominal bloating. Denies any nausea/vomiting. Continues to have bowel function.     heparin   Injectable 5000 Unit(s) SubCutaneous every 8 hours    MEDICATIONS  (PRN):  bisacodyl Suppository 10 milliGRAM(s) Rectal daily PRN Constipation  naloxone Injectable 0.1 milliGRAM(s) IV Push every 3 minutes PRN For ANY of the following changes in patient status:  A. RR LESS THAN 10 breaths per minute, B. Oxygen saturation LESS THAN 90%, C. Sedation score of 6  ondansetron Injectable 4 milliGRAM(s) IV Push every 6 hours PRN Nausea  oxyCODONE    IR 2.5 milliGRAM(s) Oral every 6 hours PRN Moderate Pain (4 - 6)  oxyCODONE    IR 5 milliGRAM(s) Oral every 6 hours PRN Severe Pain (7 - 10)  senna 1 Tablet(s) Oral daily PRN Constipation      I&O's Detail    01 Apr 2025 07:01  -  02 Apr 2025 07:00  --------------------------------------------------------  IN:    Oral Fluid: 1250 mL  Total IN: 1250 mL    OUT:    Bulb (mL): 165 mL    Voided (mL): 1140 mL  Total OUT: 1305 mL    Total NET: -55 mL          Vital Signs Last 24 Hrs  T(C): 37.5 (02 Apr 2025 05:05), Max: 37.5 (02 Apr 2025 05:05)  T(F): 99.5 (02 Apr 2025 05:05), Max: 99.5 (02 Apr 2025 05:05)  HR: 98 (02 Apr 2025 04:20) (82 - 98)  BP: 125/68 (02 Apr 2025 04:20) (123/79 - 155/83)  BP(mean): 91 (02 Apr 2025 04:20) (91 - 112)  RR: 17 (02 Apr 2025 04:20) (16 - 17)  SpO2: 95% (02 Apr 2025 04:20) (95% - 98%)    Parameters below as of 02 Apr 2025 04:20  Patient On (Oxygen Delivery Method): room air        General: NAD, resting comfortably in bed  Pulm: Nonlabored breathing, no respiratory distress  Abd: soft, nondistended, mildly diffuse TTP. SEAN x1 with serous output   Extrem: WWP, no edema, SCDs in place    LABS:                        9.5    5.21  )-----------( 236      ( 01 Apr 2025 05:30 )             30.3     04-01    141  |  106  |  5[L]  ----------------------------<  95  3.6   |  25  |  0.42[L]    Ca    8.4      01 Apr 2025 05:30  Phos  3.1     04-01  Mg     2.1     04-01        Urinalysis Basic - ( 01 Apr 2025 05:30 )    Color: x / Appearance: x / SG: x / pH: x  Gluc: 95 mg/dL / Ketone: x  / Bili: x / Urobili: x   Blood: x / Protein: x / Nitrite: x   Leuk Esterase: x / RBC: x / WBC x   Sq Epi: x / Non Sq Epi: x / Bacteria: x        RADIOLOGY & ADDITIONAL STUDIES:

## 2025-04-02 NOTE — PROGRESS NOTE ADULT - SUBJECTIVE AND OBJECTIVE BOX
INTERVAL HPI/OVERNIGHT EVENTS: +BF, -v/+min nausea, c/o bloating, senna x 1 and zofran given, R SEAN drain amylase 20     STATUS POST:  3/26: s/p open whipple and right hemicolectomy. , IVF 3000,     POST OPERATIVE DAY #: 7    SUBJECTIVE:     MEDICATIONS  (STANDING):  acetaminophen     Tablet .. 650 milliGRAM(s) Oral every 6 hours  gabapentin 100 milliGRAM(s) Oral every 8 hours  heparin   Injectable 5000 Unit(s) SubCutaneous every 8 hours  ibuprofen  Tablet. 400 milliGRAM(s) Oral every 6 hours  pantoprazole  Injectable 40 milliGRAM(s) IV Push daily    MEDICATIONS  (PRN):  bisacodyl Suppository 10 milliGRAM(s) Rectal daily PRN Constipation  naloxone Injectable 0.1 milliGRAM(s) IV Push every 3 minutes PRN For ANY of the following changes in patient status:  A. RR LESS THAN 10 breaths per minute, B. Oxygen saturation LESS THAN 90%, C. Sedation score of 6  ondansetron Injectable 4 milliGRAM(s) IV Push every 6 hours PRN Nausea  oxyCODONE    IR 2.5 milliGRAM(s) Oral every 6 hours PRN Moderate Pain (4 - 6)  oxyCODONE    IR 5 milliGRAM(s) Oral every 6 hours PRN Severe Pain (7 - 10)  senna 1 Tablet(s) Oral daily PRN Constipation      Vital Signs Last 24 Hrs  T(C): 37.5 (02 Apr 2025 05:05), Max: 37.5 (02 Apr 2025 05:05)  T(F): 99.5 (02 Apr 2025 05:05), Max: 99.5 (02 Apr 2025 05:05)  HR: 98 (02 Apr 2025 04:20) (82 - 98)  BP: 125/68 (02 Apr 2025 04:20) (123/79 - 155/83)  BP(mean): 91 (02 Apr 2025 04:20) (91 - 112)  RR: 17 (02 Apr 2025 04:20) (16 - 17)  SpO2: 95% (02 Apr 2025 04:20) (95% - 98%)    Parameters below as of 02 Apr 2025 04:20  Patient On (Oxygen Delivery Method): room air        PHYSICAL EXAM:    Constitutional: A&Ox3, nad  Respiratory: non labored breathing, no respiratory distress  Cardiovascular: NSR, RRR  Gastrointestinal:                 Incision:  Genitourinary: voiding  Extremities: (-) edema, wwp, SCDs in place                  I&O's Detail    31 Mar 2025 07:01  -  01 Apr 2025 07:00  --------------------------------------------------------  IN:    Oral Fluid: 750 mL  Total IN: 750 mL    OUT:    Bulb (mL): 0 mL    Bulb (mL): 170 mL    Voided (mL): 1100 mL  Total OUT: 1270 mL    Total NET: -520 mL      01 Apr 2025 07:01  -  02 Apr 2025 06:12  --------------------------------------------------------  IN:    Oral Fluid: 1250 mL  Total IN: 1250 mL    OUT:    Bulb (mL): 165 mL    Voided (mL): 940 mL  Total OUT: 1105 mL    Total NET: 145 mL          LABS:                        9.5    5.21  )-----------( 236      ( 01 Apr 2025 05:30 )             30.3     04-01    141  |  106  |  5[L]  ----------------------------<  95  3.6   |  25  |  0.42[L]    Ca    8.4      01 Apr 2025 05:30  Phos  3.1     04-01  Mg     2.1     04-01        Urinalysis Basic - ( 01 Apr 2025 05:30 )    Color: x / Appearance: x / SG: x / pH: x  Gluc: 95 mg/dL / Ketone: x  / Bili: x / Urobili: x   Blood: x / Protein: x / Nitrite: x   Leuk Esterase: x / RBC: x / WBC x   Sq Epi: x / Non Sq Epi: x / Bacteria: x        RADIOLOGY & ADDITIONAL STUDIES: INTERVAL HPI/OVERNIGHT EVENTS: +BF, -v/+min nausea, c/o bloating, senna x 1 and zofran given, R SEAN drain amylase 20     STATUS POST:  3/26: s/p open whipple and right hemicolectomy. , IVF 3000,     POST OPERATIVE DAY #: 7    SUBJECTIVE: Patient seen and examined at bedside. Reports abdominal bloating and continued pain overnight. Denies nausea or vomiting. +BM/+flatus     MEDICATIONS  (STANDING):  acetaminophen     Tablet .. 650 milliGRAM(s) Oral every 6 hours  gabapentin 100 milliGRAM(s) Oral every 8 hours  heparin   Injectable 5000 Unit(s) SubCutaneous every 8 hours  ibuprofen  Tablet. 400 milliGRAM(s) Oral every 6 hours  pantoprazole  Injectable 40 milliGRAM(s) IV Push daily    MEDICATIONS  (PRN):  bisacodyl Suppository 10 milliGRAM(s) Rectal daily PRN Constipation  naloxone Injectable 0.1 milliGRAM(s) IV Push every 3 minutes PRN For ANY of the following changes in patient status:  A. RR LESS THAN 10 breaths per minute, B. Oxygen saturation LESS THAN 90%, C. Sedation score of 6  ondansetron Injectable 4 milliGRAM(s) IV Push every 6 hours PRN Nausea  oxyCODONE    IR 2.5 milliGRAM(s) Oral every 6 hours PRN Moderate Pain (4 - 6)  oxyCODONE    IR 5 milliGRAM(s) Oral every 6 hours PRN Severe Pain (7 - 10)  senna 1 Tablet(s) Oral daily PRN Constipation      Vital Signs Last 24 Hrs  T(C): 37.5 (02 Apr 2025 05:05), Max: 37.5 (02 Apr 2025 05:05)  T(F): 99.5 (02 Apr 2025 05:05), Max: 99.5 (02 Apr 2025 05:05)  HR: 98 (02 Apr 2025 04:20) (82 - 98)  BP: 125/68 (02 Apr 2025 04:20) (123/79 - 155/83)  BP(mean): 91 (02 Apr 2025 04:20) (91 - 112)  RR: 17 (02 Apr 2025 04:20) (16 - 17)  SpO2: 95% (02 Apr 2025 04:20) (95% - 98%)    Parameters below as of 02 Apr 2025 04:20  Patient On (Oxygen Delivery Method): room air        PHYSICAL EXAM:  Constitutional: A&Ox3, nad  Respiratory: non labored breathing, no respiratory distress  Cardiovascular: NSR, RRR  Gastrointestinal: abd soft, mildly TTP in all 4 quads, ND, R SEAN with serous outpt                 Incision: midline C/D/I  Genitourinary: voiding  Extremities: (-) edema, wwp, SCDs in place              I&O's Detail    31 Mar 2025 07:01  -  01 Apr 2025 07:00  --------------------------------------------------------  IN:    Oral Fluid: 750 mL  Total IN: 750 mL    OUT:    Bulb (mL): 0 mL    Bulb (mL): 170 mL    Voided (mL): 1100 mL  Total OUT: 1270 mL    Total NET: -520 mL      01 Apr 2025 07:01  -  02 Apr 2025 06:12  --------------------------------------------------------  IN:    Oral Fluid: 1250 mL  Total IN: 1250 mL    OUT:    Bulb (mL): 165 mL    Voided (mL): 940 mL  Total OUT: 1105 mL    Total NET: 145 mL          LABS:                        9.5    5.21  )-----------( 236      ( 01 Apr 2025 05:30 )             30.3     04-01    141  |  106  |  5[L]  ----------------------------<  95  3.6   |  25  |  0.42[L]    Ca    8.4      01 Apr 2025 05:30  Phos  3.1     04-01  Mg     2.1     04-01        Urinalysis Basic - ( 01 Apr 2025 05:30 )    Color: x / Appearance: x / SG: x / pH: x  Gluc: 95 mg/dL / Ketone: x  / Bili: x / Urobili: x   Blood: x / Protein: x / Nitrite: x   Leuk Esterase: x / RBC: x / WBC x   Sq Epi: x / Non Sq Epi: x / Bacteria: x        RADIOLOGY & ADDITIONAL STUDIES:

## 2025-04-02 NOTE — PROVIDER CONTACT NOTE (OTHER) - ACTION/TREATMENT ORDERED:
MIHIR Rosas aware, will continue to monitor patient, no interventions at this time.
Provider aware, came to see patient at bedside
Antonio ASH agreed with this RN and will consult with AM team/SICU for ultrasound IV/midline placement. States we can replete electrolytes w/ PO meds in AM PRN.

## 2025-04-03 LAB
AMYLASE FLD-CCNC: 21 U/L — SIGNIFICANT CHANGE UP
ANION GAP SERPL CALC-SCNC: 15 MMOL/L — SIGNIFICANT CHANGE UP (ref 5–17)
BUN SERPL-MCNC: 16 MG/DL — SIGNIFICANT CHANGE UP (ref 7–23)
CALCIUM SERPL-MCNC: 8.7 MG/DL — SIGNIFICANT CHANGE UP (ref 8.4–10.5)
CHLORIDE SERPL-SCNC: 99 MMOL/L — SIGNIFICANT CHANGE UP (ref 96–108)
CO2 SERPL-SCNC: 25 MMOL/L — SIGNIFICANT CHANGE UP (ref 22–31)
CREAT SERPL-MCNC: 0.44 MG/DL — LOW (ref 0.5–1.3)
EGFR: 111 ML/MIN/1.73M2 — SIGNIFICANT CHANGE UP
EGFR: 111 ML/MIN/1.73M2 — SIGNIFICANT CHANGE UP
FLUAV AG NPH QL: SIGNIFICANT CHANGE UP
FLUBV AG NPH QL: SIGNIFICANT CHANGE UP
GLUCOSE SERPL-MCNC: 115 MG/DL — HIGH (ref 70–99)
HCT VFR BLD CALC: 31.5 % — LOW (ref 34.5–45)
HGB BLD-MCNC: 9.8 G/DL — LOW (ref 11.5–15.5)
MAGNESIUM SERPL-MCNC: 2.4 MG/DL — SIGNIFICANT CHANGE UP (ref 1.6–2.6)
MCHC RBC-ENTMCNC: 23.6 PG — LOW (ref 27–34)
MCHC RBC-ENTMCNC: 31.1 G/DL — LOW (ref 32–36)
MCV RBC AUTO: 75.7 FL — LOW (ref 80–100)
NRBC BLD AUTO-RTO: 0 /100 WBCS — SIGNIFICANT CHANGE UP (ref 0–0)
PHOSPHATE SERPL-MCNC: 3.1 MG/DL — SIGNIFICANT CHANGE UP (ref 2.5–4.5)
PLATELET # BLD AUTO: 338 K/UL — SIGNIFICANT CHANGE UP (ref 150–400)
POTASSIUM SERPL-MCNC: 3.5 MMOL/L — SIGNIFICANT CHANGE UP (ref 3.5–5.3)
POTASSIUM SERPL-SCNC: 3.5 MMOL/L — SIGNIFICANT CHANGE UP (ref 3.5–5.3)
RBC # BLD: 4.16 M/UL — SIGNIFICANT CHANGE UP (ref 3.8–5.2)
RBC # FLD: 14.1 % — SIGNIFICANT CHANGE UP (ref 10.3–14.5)
RSV RNA NPH QL NAA+NON-PROBE: SIGNIFICANT CHANGE UP
SARS-COV-2 RNA SPEC QL NAA+PROBE: SIGNIFICANT CHANGE UP
SODIUM SERPL-SCNC: 139 MMOL/L — SIGNIFICANT CHANGE UP (ref 135–145)
SOURCE RESPIRATORY: SIGNIFICANT CHANGE UP
WBC # BLD: 15.23 K/UL — HIGH (ref 3.8–10.5)
WBC # FLD AUTO: 15.23 K/UL — HIGH (ref 3.8–10.5)

## 2025-04-03 RX ORDER — LIDOCAINE HYDROCHLORIDE 20 MG/ML
1 JELLY TOPICAL ONCE
Refills: 0 | Status: COMPLETED | OUTPATIENT
Start: 2025-04-03 | End: 2025-04-03

## 2025-04-03 RX ORDER — LIDOCAINE HCL/PF 10 MG/ML
5 VIAL (ML) INJECTION ONCE
Refills: 0 | Status: COMPLETED | OUTPATIENT
Start: 2025-04-03 | End: 2025-04-03

## 2025-04-03 RX ORDER — HYDROMORPHONE/SOD CHLOR,ISO/PF 2 MG/10 ML
0.2 SYRINGE (ML) INJECTION ONCE
Refills: 0 | Status: DISCONTINUED | OUTPATIENT
Start: 2025-04-03 | End: 2025-04-03

## 2025-04-03 RX ORDER — ACETAMINOPHEN 500 MG/5ML
750 LIQUID (ML) ORAL ONCE
Refills: 0 | Status: COMPLETED | OUTPATIENT
Start: 2025-04-03 | End: 2025-04-03

## 2025-04-03 RX ORDER — LIPASE/PROTEASE/AMYLASE 10K-37.5K
2 CAPSULE,DELAYED RELEASE (ENTERIC COATED) ORAL
Refills: 0 | Status: DISCONTINUED | OUTPATIENT
Start: 2025-04-03 | End: 2025-04-07

## 2025-04-03 RX ORDER — SODIUM CHLORIDE 9 G/1000ML
1000 INJECTION, SOLUTION INTRAVENOUS
Refills: 0 | Status: DISCONTINUED | OUTPATIENT
Start: 2025-04-03 | End: 2025-04-04

## 2025-04-03 RX ADMIN — Medication 650 MILLIGRAM(S): at 06:30

## 2025-04-03 RX ADMIN — Medication 400 MILLIGRAM(S): at 22:35

## 2025-04-03 RX ADMIN — Medication 650 MILLIGRAM(S): at 00:31

## 2025-04-03 RX ADMIN — Medication 25 GRAM(S): at 05:30

## 2025-04-03 RX ADMIN — Medication 650 MILLIGRAM(S): at 12:18

## 2025-04-03 RX ADMIN — Medication 25 GRAM(S): at 23:22

## 2025-04-03 RX ADMIN — HEPARIN SODIUM 5000 UNIT(S): 1000 INJECTION INTRAVENOUS; SUBCUTANEOUS at 05:31

## 2025-04-03 RX ADMIN — Medication 5 MILLILITER(S): at 10:04

## 2025-04-03 RX ADMIN — Medication 400 MILLIGRAM(S): at 15:50

## 2025-04-03 RX ADMIN — GABAPENTIN 100 MILLIGRAM(S): 400 CAPSULE ORAL at 21:35

## 2025-04-03 RX ADMIN — LIDOCAINE HYDROCHLORIDE 1 PATCH: 20 JELLY TOPICAL at 18:51

## 2025-04-03 RX ADMIN — Medication 300 MILLIGRAM(S): at 17:42

## 2025-04-03 RX ADMIN — Medication 750 MILLIGRAM(S): at 18:10

## 2025-04-03 RX ADMIN — Medication 40 MILLIGRAM(S): at 12:17

## 2025-04-03 RX ADMIN — Medication 650 MILLIGRAM(S): at 23:23

## 2025-04-03 RX ADMIN — HEPARIN SODIUM 5000 UNIT(S): 1000 INJECTION INTRAVENOUS; SUBCUTANEOUS at 21:36

## 2025-04-03 RX ADMIN — LIDOCAINE HYDROCHLORIDE 1 PATCH: 20 JELLY TOPICAL at 12:17

## 2025-04-03 RX ADMIN — Medication 40 MILLIEQUIVALENT(S): at 12:18

## 2025-04-03 RX ADMIN — GABAPENTIN 100 MILLIGRAM(S): 400 CAPSULE ORAL at 15:25

## 2025-04-03 RX ADMIN — Medication 650 MILLIGRAM(S): at 12:45

## 2025-04-03 RX ADMIN — Medication 2 CAPSULE(S): at 17:42

## 2025-04-03 RX ADMIN — Medication 400 MILLIGRAM(S): at 10:47

## 2025-04-03 RX ADMIN — Medication 0.2 MILLIGRAM(S): at 10:47

## 2025-04-03 RX ADMIN — GABAPENTIN 100 MILLIGRAM(S): 400 CAPSULE ORAL at 05:30

## 2025-04-03 RX ADMIN — Medication 400 MILLIGRAM(S): at 11:20

## 2025-04-03 RX ADMIN — LIDOCAINE HYDROCHLORIDE 1 PATCH: 20 JELLY TOPICAL at 23:48

## 2025-04-03 RX ADMIN — HEPARIN SODIUM 5000 UNIT(S): 1000 INJECTION INTRAVENOUS; SUBCUTANEOUS at 15:25

## 2025-04-03 RX ADMIN — Medication 0.2 MILLIGRAM(S): at 11:20

## 2025-04-03 RX ADMIN — Medication 25 GRAM(S): at 15:24

## 2025-04-03 RX ADMIN — Medication 2 CAPSULE(S): at 12:46

## 2025-04-03 RX ADMIN — Medication 400 MILLIGRAM(S): at 21:35

## 2025-04-03 RX ADMIN — Medication 400 MILLIGRAM(S): at 15:25

## 2025-04-03 RX ADMIN — Medication 650 MILLIGRAM(S): at 05:30

## 2025-04-03 NOTE — PROGRESS NOTE ADULT - ASSESSMENT
60yo F with PMH of HTN and no significant PSx found to have a 3cm polypoid lesion of the appendiceal orifice on screening colonoscopy with 3cm pancreatic head cyst concerning for neuroendocrine tumor on f/u CT scan. Subsequent EGD/EUS confirmed neuroendocrine pathology. Now s/p open whipple and right hemicolectomy on 3/26.    F/u pain control and diet tolerance  OOBA  Care per primary team

## 2025-04-03 NOTE — PROGRESS NOTE ADULT - SUBJECTIVE AND OBJECTIVE BOX
INTERVAL HPI/OVERNIGHT EVENTS: SEAN serous, ooba, -n/-v, states has low apetite, feels bloated, ttp in RUQ and RLQ, +distended, denies chills,     STATUS POST: 3/26: s/p open whipple and right hemicolectomy    SUBJECTIVE: Patient seen bedside on AM rounds with chief resident and attending. She reports feeling a little better today. She is tolerating her diet and has been out of bed ambulating. Denies cp, sob, nausea, emesis, or fevers.    MEDICATIONS  (STANDING):  acetaminophen     Tablet .. 650 milliGRAM(s) Oral every 6 hours  gabapentin 100 milliGRAM(s) Oral every 8 hours  heparin   Injectable 5000 Unit(s) SubCutaneous every 8 hours  ibuprofen  Tablet. 400 milliGRAM(s) Oral every 6 hours  lidocaine 1% Injectable 5 milliLiter(s) Local Injection once  pancrelipase  (CREON 24,000 Lipase Units) 2 Capsule(s) Oral three times a day with meals  pantoprazole  Injectable 40 milliGRAM(s) IV Push daily  piperacillin/tazobactam IVPB.. 3.375 Gram(s) IV Intermittent every 8 hours  potassium chloride   Powder 40 milliEquivalent(s) Oral once    MEDICATIONS  (PRN):  bisacodyl Suppository 10 milliGRAM(s) Rectal daily PRN Constipation  naloxone Injectable 0.1 milliGRAM(s) IV Push every 3 minutes PRN For ANY of the following changes in patient status:  A. RR LESS THAN 10 breaths per minute, B. Oxygen saturation LESS THAN 90%, C. Sedation score of 6  ondansetron Injectable 4 milliGRAM(s) IV Push every 6 hours PRN Nausea  oxyCODONE    IR 2.5 milliGRAM(s) Oral every 6 hours PRN Moderate Pain (4 - 6)  oxyCODONE    IR 5 milliGRAM(s) Oral every 6 hours PRN Severe Pain (7 - 10)  senna 1 Tablet(s) Oral daily PRN Constipation      Vital Signs Last 24 Hrs  T(C): 36.6 (03 Apr 2025 09:02), Max: 37.1 (02 Apr 2025 22:21)  T(F): 97.8 (03 Apr 2025 09:02), Max: 98.7 (02 Apr 2025 22:21)  HR: 84 (03 Apr 2025 08:30) (84 - 94)  BP: 103/56 (03 Apr 2025 08:30) (95/51 - 111/61)  BP(mean): 73 (03 Apr 2025 08:30) (69 - 83)  RR: 18 (03 Apr 2025 08:30) (16 - 18)  SpO2: 97% (03 Apr 2025 08:30) (95% - 98%)    Parameters below as of 03 Apr 2025 08:30  Patient On (Oxygen Delivery Method): room air        PHYSICAL EXAM:  Constitutional: A&Ox3, resting comfortably in bed  Respiratory: non labored breathing, no respiratory distress  Cardiovascular: NSR, RRR  Gastrointestinal: Abdomen soft, nontender, nondistended. SEAN with seropurulent output.                 Incision: Incision c/d/i with staples   Genitourinary: voiding  Extremities: wwp, no calf tenderness or edema, +SCDs      I&O's Detail    02 Apr 2025 07:01  -  03 Apr 2025 07:00  --------------------------------------------------------  IN:    IV PiggyBack: 200 mL    Oral Fluid: 650 mL  Total IN: 850 mL    OUT:    Bulb (mL): 225 mL    Voided (mL): 1150 mL  Total OUT: 1375 mL    Total NET: -525 mL          LABS:                        9.8    15.23 )-----------( 338      ( 03 Apr 2025 05:30 )             31.5     04-03    139  |  99  |  16  ----------------------------<  115[H]  3.5   |  25  |  0.44[L]    Ca    8.7      03 Apr 2025 05:30  Phos  3.1     04-03  Mg     2.4     04-03        Urinalysis Basic - ( 03 Apr 2025 05:30 )    Color: x / Appearance: x / SG: x / pH: x  Gluc: 115 mg/dL / Ketone: x  / Bili: x / Urobili: x   Blood: x / Protein: x / Nitrite: x   Leuk Esterase: x / RBC: x / WBC x   Sq Epi: x / Non Sq Epi: x / Bacteria: x        RADIOLOGY & ADDITIONAL STUDIES:

## 2025-04-03 NOTE — PROGRESS NOTE ADULT - SUBJECTIVE AND OBJECTIVE BOX
INTERVAL HPI/OVERNIGHT EVENTS: SEAN serous, ooba, -n/-v, states has low apetite, feels bloated, ttp in RUQ and RLQ, +distended, denies chills,     STATUS POST:  3/26: s/p open whipple and right hemicolectomy. , IVF 3000,     POST OPERATIVE DAY #: 8    SUBJECTIVE:     MEDICATIONS  (STANDING):  acetaminophen     Tablet .. 650 milliGRAM(s) Oral every 6 hours  gabapentin 100 milliGRAM(s) Oral every 8 hours  heparin   Injectable 5000 Unit(s) SubCutaneous every 8 hours  ibuprofen  Tablet. 400 milliGRAM(s) Oral every 6 hours  pantoprazole  Injectable 40 milliGRAM(s) IV Push daily  piperacillin/tazobactam IVPB.. 3.375 Gram(s) IV Intermittent every 8 hours    MEDICATIONS  (PRN):  bisacodyl Suppository 10 milliGRAM(s) Rectal daily PRN Constipation  naloxone Injectable 0.1 milliGRAM(s) IV Push every 3 minutes PRN For ANY of the following changes in patient status:  A. RR LESS THAN 10 breaths per minute, B. Oxygen saturation LESS THAN 90%, C. Sedation score of 6  ondansetron Injectable 4 milliGRAM(s) IV Push every 6 hours PRN Nausea  oxyCODONE    IR 2.5 milliGRAM(s) Oral every 6 hours PRN Moderate Pain (4 - 6)  oxyCODONE    IR 5 milliGRAM(s) Oral every 6 hours PRN Severe Pain (7 - 10)  senna 1 Tablet(s) Oral daily PRN Constipation      Vital Signs Last 24 Hrs  T(C): 36.6 (03 Apr 2025 05:16), Max: 37.1 (02 Apr 2025 22:21)  T(F): 97.9 (03 Apr 2025 05:16), Max: 98.7 (02 Apr 2025 22:21)  HR: 88 (03 Apr 2025 04:10) (84 - 94)  BP: 106/57 (03 Apr 2025 04:10) (95/51 - 111/61)  BP(mean): 75 (03 Apr 2025 04:10) (69 - 83)  RR: 17 (03 Apr 2025 04:10) (16 - 18)  SpO2: 96% (03 Apr 2025 04:10) (95% - 98%)    Parameters below as of 03 Apr 2025 04:10  Patient On (Oxygen Delivery Method): room air        PHYSICAL EXAM:    Constitutional: A&Ox3, nad  Respiratory: non labored breathing, no respiratory distress  Cardiovascular: NSR, RRR  Gastrointestinal:                 Incision:  Genitourinary: voiding  Extremities: (-) edema, wwp, SCDs in place                  I&O's Detail    01 Apr 2025 07:01  -  02 Apr 2025 07:00  --------------------------------------------------------  IN:    Oral Fluid: 1250 mL  Total IN: 1250 mL    OUT:    Bulb (mL): 165 mL    Voided (mL): 1140 mL  Total OUT: 1305 mL    Total NET: -55 mL      02 Apr 2025 07:01  -  03 Apr 2025 06:13  --------------------------------------------------------  IN:    IV PiggyBack: 200 mL    Oral Fluid: 650 mL  Total IN: 850 mL    OUT:    Bulb (mL): 225 mL    Voided (mL): 1150 mL  Total OUT: 1375 mL    Total NET: -525 mL          LABS:                        9.7    17.02 )-----------( 286      ( 02 Apr 2025 05:30 )             31.4     04-02    136  |  101  |  13  ----------------------------<  125[H]  3.7   |  24  |  0.45[L]    Ca    8.3[L]      02 Apr 2025 05:30  Phos  3.7     04-02  Mg     1.7     04-02        Urinalysis Basic - ( 02 Apr 2025 05:30 )    Color: x / Appearance: x / SG: x / pH: x  Gluc: 125 mg/dL / Ketone: x  / Bili: x / Urobili: x   Blood: x / Protein: x / Nitrite: x   Leuk Esterase: x / RBC: x / WBC x   Sq Epi: x / Non Sq Epi: x / Bacteria: x        RADIOLOGY & ADDITIONAL STUDIES: INTERVAL HPI/OVERNIGHT EVENTS: SEAN serous, ooba, -n/-v, states has low apetite, feels bloated, ttp in RUQ and RLQ, +distended, denies chills,     STATUS POST:  3/26: s/p open whipple and right hemicolectomy. , IVF 3000,     POST OPERATIVE DAY #: 8    SUBJECTIVE: Patient denies any nausea or vomiting. Tolerating diet. +OOBA.     MEDICATIONS  (STANDING):  acetaminophen     Tablet .. 650 milliGRAM(s) Oral every 6 hours  gabapentin 100 milliGRAM(s) Oral every 8 hours  heparin   Injectable 5000 Unit(s) SubCutaneous every 8 hours  ibuprofen  Tablet. 400 milliGRAM(s) Oral every 6 hours  pantoprazole  Injectable 40 milliGRAM(s) IV Push daily  piperacillin/tazobactam IVPB.. 3.375 Gram(s) IV Intermittent every 8 hours    MEDICATIONS  (PRN):  bisacodyl Suppository 10 milliGRAM(s) Rectal daily PRN Constipation  naloxone Injectable 0.1 milliGRAM(s) IV Push every 3 minutes PRN For ANY of the following changes in patient status:  A. RR LESS THAN 10 breaths per minute, B. Oxygen saturation LESS THAN 90%, C. Sedation score of 6  ondansetron Injectable 4 milliGRAM(s) IV Push every 6 hours PRN Nausea  oxyCODONE    IR 2.5 milliGRAM(s) Oral every 6 hours PRN Moderate Pain (4 - 6)  oxyCODONE    IR 5 milliGRAM(s) Oral every 6 hours PRN Severe Pain (7 - 10)  senna 1 Tablet(s) Oral daily PRN Constipation      Vital Signs Last 24 Hrs  T(C): 36.6 (03 Apr 2025 05:16), Max: 37.1 (02 Apr 2025 22:21)  T(F): 97.9 (03 Apr 2025 05:16), Max: 98.7 (02 Apr 2025 22:21)  HR: 88 (03 Apr 2025 04:10) (84 - 94)  BP: 106/57 (03 Apr 2025 04:10) (95/51 - 111/61)  BP(mean): 75 (03 Apr 2025 04:10) (69 - 83)  RR: 17 (03 Apr 2025 04:10) (16 - 18)  SpO2: 96% (03 Apr 2025 04:10) (95% - 98%)    Parameters below as of 03 Apr 2025 04:10  Patient On (Oxygen Delivery Method): room air        PHYSICAL EXAM:    Constitutional: A&Ox3, nad  Respiratory: non labored breathing, no respiratory distress  Cardiovascular: NSR, RRR  Gastrointestinal: abd soft, NT, ND, SEAN with serous outpt                 Incision: C/D/I  Genitourinary: voiding  Extremities: (-) edema, wwp, SCDs in place                  I&O's Detail    01 Apr 2025 07:01  -  02 Apr 2025 07:00  --------------------------------------------------------  IN:    Oral Fluid: 1250 mL  Total IN: 1250 mL    OUT:    Bulb (mL): 165 mL    Voided (mL): 1140 mL  Total OUT: 1305 mL    Total NET: -55 mL      02 Apr 2025 07:01  -  03 Apr 2025 06:13  --------------------------------------------------------  IN:    IV PiggyBack: 200 mL    Oral Fluid: 650 mL  Total IN: 850 mL    OUT:    Bulb (mL): 225 mL    Voided (mL): 1150 mL  Total OUT: 1375 mL    Total NET: -525 mL          LABS:                        9.7    17.02 )-----------( 286      ( 02 Apr 2025 05:30 )             31.4     04-02    136  |  101  |  13  ----------------------------<  125[H]  3.7   |  24  |  0.45[L]    Ca    8.3[L]      02 Apr 2025 05:30  Phos  3.7     04-02  Mg     1.7     04-02        Urinalysis Basic - ( 02 Apr 2025 05:30 )    Color: x / Appearance: x / SG: x / pH: x  Gluc: 125 mg/dL / Ketone: x  / Bili: x / Urobili: x   Blood: x / Protein: x / Nitrite: x   Leuk Esterase: x / RBC: x / WBC x   Sq Epi: x / Non Sq Epi: x / Bacteria: x        RADIOLOGY & ADDITIONAL STUDIES:

## 2025-04-03 NOTE — CHART NOTE - NSCHARTNOTEFT_GEN_A_CORE
Admitting Diagnosis:   Patient is a 59y old  Female who presents with a chief complaint of Whipple (03 Apr 2025 09:21)  PAST MEDICAL & SURGICAL HISTORY:  HTN (hypertension)  History of excision of dermoid cyst  LEFT Breast - 30 to 40 years    Current Nutrition Order: low fat diet     PO Intake: Good (%) [   ]  Fair (50-75%) [   ] Poor (<25%) [   ] *some fair, some varied PO intakes*    GI Issues: noted with abdominal discomfort, audible and active bowel sounds noted; moderate liquid BMs noted per nursing; BM on 4/2/25; previous distention on 4/2/25 per nursing    Pain: noted with some pain relief (level 5 to level 0)    Skin Integrity: SEAN drain to right lower quadrant noted; midline abdomen surgical incisions; no documented edema or pressure ulcers; Rich: 20       04-02-25 @ 07:01  -  04-03-25 @ 07:00  --------------------------------------------------------  IN: 850 mL / OUT: 1375 mL / NET: -525 mL    04-03-25 @ 07:01  -  04-03-25 @ 14:24  --------------------------------------------------------  IN: 0 mL / OUT: 435 mL / NET: -435 mL        Labs:   04-03    139  |  99  |  16  ----------------------------<  115[H]  3.5   |  25  |  0.44[L]    Ca    8.7      03 Apr 2025 05:30  Phos  3.1     04-03  Mg     2.4     04-03      CAPILLARY BLOOD GLUCOSE      POCT Blood Glucose.: 120 mg/dL (03 Apr 2025 11:47)  POCT Blood Glucose.: 183 mg/dL (02 Apr 2025 16:00)      Medications:  MEDICATIONS  (STANDING):  acetaminophen     Tablet .. 650 milliGRAM(s) Oral every 6 hours  gabapentin 100 milliGRAM(s) Oral every 8 hours  heparin   Injectable 5000 Unit(s) SubCutaneous every 8 hours  ibuprofen  Tablet. 400 milliGRAM(s) Oral every 6 hours  pancrelipase  (CREON 24,000 Lipase Units) 2 Capsule(s) Oral three times a day with meals  pantoprazole  Injectable 40 milliGRAM(s) IV Push daily  piperacillin/tazobactam IVPB.. 3.375 Gram(s) IV Intermittent every 8 hours    MEDICATIONS  (PRN):  bisacodyl Suppository 10 milliGRAM(s) Rectal daily PRN Constipation  naloxone Injectable 0.1 milliGRAM(s) IV Push every 3 minutes PRN For ANY of the following changes in patient status:  A. RR LESS THAN 10 breaths per minute, B. Oxygen saturation LESS THAN 90%, C. Sedation score of 6  ondansetron Injectable 4 milliGRAM(s) IV Push every 6 hours PRN Nausea  oxyCODONE    IR 2.5 milliGRAM(s) Oral every 6 hours PRN Moderate Pain (4 - 6)  oxyCODONE    IR 5 milliGRAM(s) Oral every 6 hours PRN Severe Pain (7 - 10)  senna 1 Tablet(s) Oral daily PRN Constipation    Dosing Anthropometrics:   Height for BMI (FEET)	5 Feet  Height for BMI (INCHES)	4 Inch(s)  Height for BMI (CM)            162.56 Centimeter(s)  Weight for BMI (lbs)	110.7 lb  Weight for BMI (kg)	50.2 kg  Body Mass Index	              18.9   ideal body weight:                120 pounds, pt is ~92% of ideal body weight    Weight Change: no new weights noted in electronic medical records; recommend that nursing obtain updated weights weekly prn. RD to monitor trends.     [Mild Protein Calorie Malnutrition: Risk Assessment Completed on 3/31/25]  - PO intake: sudden poor PO intakes just prior to or during admission  - Wt Loss: sudden weight loss during hospitalization or prior to admission    Estimated energy needs:  Weight used for calculations	current weight   Estimated Energy Needs Weight (lbs)	110.6 lb  Estimated Energy Needs Weight (kg)	50.2 kg  Estimated Energy Needs From (andria/kg)	25   Estimated Energy Needs To (andria/kg)	30   Estimated Energy Needs Calculated From (andria/kg)	1255   Estimated Energy Needs Calculated To (andria/kg)	1506   Weight used for calculations	current weight   Estimated Protein Needs Weight (lbs)	110.6 lb  Estimated Protein Needs Weight (kg)	50.2 kg  Estimated Protein Needs From (g/kg)	1.2   Estimated Protein Needs To (g/kg)	1.4   Estimated Protein Needs Calculated From (g/kg)	60.24   Estimated Protein Needs Calculated To (g/kg)	70.28   Estimated Fluid Needs Weight (lbs)	110.6 lb  Estimated Fluid Needs Weight (kg)	50.2 kg  Estimated Fluid Needs From (ml/kg)	25   Estimated Fluid Needs To (ml/kg)	30   Estimated Fluid Needs Calculated From (ml/kg)	1255   Estimated Fluid Needs Calculated To (ml/kg)	1506   Other Calculations	Pt is within ~% ideal body weight, thus actual body weight used for all calculations. Needs adjusted for age, physiological demands, postoperative status.     Subjective: This is a 59 year old female with a past medical history significant for HTN and no significant PSx found to have a 3cm polypoid lesion of the appendiceal orifice on screening colonoscopy with 3cm pancreatic head cyst concerning for neuroendocrine tumor on f/u CT scan. Subsequent EGD/EUS confirmed neuroendocrine pathology.    RD attempted to visit pt at bedside for nutrition follow up evaluation 2x. Pt was receiving care from pt's medical team. RD obtained information from nursing, electronic medical records data, medical team. Pt is on a low fat diet, tolerating fairly, some fair, some variable, some poor PO intakes overall. Pt noted with moderate liquid light brown BMs, abdominal discomfort, tender abdomen, some distention noted. Incisions remain, SEAN remains. Labs reviewed: low creatinine (0.44), elevated POCT glucose (183), however it has decreased today (120), medical team is aware, RD to monitor trends. Pt remains on IV antibiotics, zofran, protonix, pancrelipase, ducolax, senna. Education deferred at this time related to pt receiving care and unavailable at the time RD attempted to visit her 2x for nutrition follow up evaluation. RD to remain available. Please see additional nutrition recommendations below.     Previous Nutrition Diagnosis: Malnutrition. of mild degree in the context of acute onset of illness/injury related to decreased appetite as evidenced by sudden weight loss and poor PO intakes    Active [ x ]  Resolved [   ]    If resolved, new PES:     Goal: Pt to consistently meet at least 75% of estimated energy expenditure via tolerated route that is consistent with goals of care and will no longer exhibit s/s of malnutrition during hospital stay    Nutrition Recommendations:   1. Continue with current diet order   *continue Ensure Max protein oral nutrition supplement as a low fat, high protein, addition to meals to provide more calories, protein, nutrients, consider 2x/day to not overwhelm pt (150 calories, 30g protein per serving)**  **Provide additional nourishments and/or oral nutrition supplements per pt preferences and/or if pt's PO intakes are consistently <50%**  2. Encourage pt to meet nutritional needs as able   3. Monitor PO intakes, trend weights (weekly), monitor skin integrity, monitor labs (electrolytes, CMP), monitor GI function   4. Encourage adherence to diet education (reinforce as able)   5. Consider micronutrient supplementation   6. Pain and bowel regimen per team   **consider Banatrol or other interventions in the setting of liquid BMs, promote fecal bulk**  7. Will continue to assess/honor preferences as able   8. Align nutrition interventions with goals of care at all times    Education: deferred at this time related to pt receiving care and unavailable at the time RD attempted to visit her 2x for nutrition follow up evaluation.     Risk Level: High [ x ] Moderate [   ] Low [   ]

## 2025-04-03 NOTE — PROGRESS NOTE ADULT - ASSESSMENT
60yo F with PMH of HTN and no significant PSx found to have a 3cm polypoid lesion of the appendiceal orifice on screening colonoscopy with 3cm pancreatic head cyst concerning for neuroendocrine tumor on f/u CT scan. Subsequent EGD/EUS confirmed neuroendocrine pathology. Now s/p open whipple and right hemicolectomy on 3/26.    LFD + ensures  Whipple Pathway  Pain and nausea control  zosyn  R SEAN drain   Protonix 40mg IV qd  HSQ/SCDs/IS/OOBA  AM labs  Dispo: [facility]

## 2025-04-04 LAB
ANION GAP SERPL CALC-SCNC: 11 MMOL/L — SIGNIFICANT CHANGE UP (ref 5–17)
BASOPHILS # BLD AUTO: 0.02 K/UL — SIGNIFICANT CHANGE UP (ref 0–0.2)
BASOPHILS NFR BLD AUTO: 0.2 % — SIGNIFICANT CHANGE UP (ref 0–2)
BUN SERPL-MCNC: 13 MG/DL — SIGNIFICANT CHANGE UP (ref 7–23)
CALCIUM SERPL-MCNC: 8.4 MG/DL — SIGNIFICANT CHANGE UP (ref 8.4–10.5)
CHLORIDE SERPL-SCNC: 104 MMOL/L — SIGNIFICANT CHANGE UP (ref 96–108)
CO2 SERPL-SCNC: 23 MMOL/L — SIGNIFICANT CHANGE UP (ref 22–31)
CREAT SERPL-MCNC: 0.39 MG/DL — LOW (ref 0.5–1.3)
EGFR: 115 ML/MIN/1.73M2 — SIGNIFICANT CHANGE UP
EGFR: 115 ML/MIN/1.73M2 — SIGNIFICANT CHANGE UP
EOSINOPHIL # BLD AUTO: 0.08 K/UL — SIGNIFICANT CHANGE UP (ref 0–0.5)
EOSINOPHIL NFR BLD AUTO: 0.9 % — SIGNIFICANT CHANGE UP (ref 0–6)
GLUCOSE SERPL-MCNC: 112 MG/DL — HIGH (ref 70–99)
HCT VFR BLD CALC: 28.6 % — LOW (ref 34.5–45)
HGB BLD-MCNC: 8.7 G/DL — LOW (ref 11.5–15.5)
IMM GRANULOCYTES NFR BLD AUTO: 0.9 % — SIGNIFICANT CHANGE UP (ref 0–0.9)
LYMPHOCYTES # BLD AUTO: 0.52 K/UL — LOW (ref 1–3.3)
LYMPHOCYTES # BLD AUTO: 5.9 % — LOW (ref 13–44)
MAGNESIUM SERPL-MCNC: 2 MG/DL — SIGNIFICANT CHANGE UP (ref 1.6–2.6)
MCHC RBC-ENTMCNC: 23.2 PG — LOW (ref 27–34)
MCHC RBC-ENTMCNC: 30.4 G/DL — LOW (ref 32–36)
MCV RBC AUTO: 76.3 FL — LOW (ref 80–100)
MONOCYTES # BLD AUTO: 0.77 K/UL — SIGNIFICANT CHANGE UP (ref 0–0.9)
MONOCYTES NFR BLD AUTO: 8.7 % — SIGNIFICANT CHANGE UP (ref 2–14)
NEUTROPHILS # BLD AUTO: 7.41 K/UL — HIGH (ref 1.8–7.4)
NEUTROPHILS NFR BLD AUTO: 83.4 % — HIGH (ref 43–77)
NRBC BLD AUTO-RTO: 0 /100 WBCS — SIGNIFICANT CHANGE UP (ref 0–0)
PHOSPHATE SERPL-MCNC: 3.4 MG/DL — SIGNIFICANT CHANGE UP (ref 2.5–4.5)
PLATELET # BLD AUTO: 326 K/UL — SIGNIFICANT CHANGE UP (ref 150–400)
POTASSIUM SERPL-MCNC: 3.8 MMOL/L — SIGNIFICANT CHANGE UP (ref 3.5–5.3)
POTASSIUM SERPL-SCNC: 3.8 MMOL/L — SIGNIFICANT CHANGE UP (ref 3.5–5.3)
RBC # BLD: 3.75 M/UL — LOW (ref 3.8–5.2)
RBC # FLD: 13.9 % — SIGNIFICANT CHANGE UP (ref 10.3–14.5)
SODIUM SERPL-SCNC: 138 MMOL/L — SIGNIFICANT CHANGE UP (ref 135–145)
WBC # BLD: 8.88 K/UL — SIGNIFICANT CHANGE UP (ref 3.8–10.5)
WBC # FLD AUTO: 8.88 K/UL — SIGNIFICANT CHANGE UP (ref 3.8–10.5)

## 2025-04-04 RX ORDER — LIDOCAINE HYDROCHLORIDE 20 MG/ML
1 JELLY TOPICAL ONCE
Refills: 0 | Status: COMPLETED | OUTPATIENT
Start: 2025-04-04 | End: 2025-04-04

## 2025-04-04 RX ORDER — SIMETHICONE 80 MG
80 TABLET,CHEWABLE ORAL EVERY 12 HOURS
Refills: 0 | Status: DISCONTINUED | OUTPATIENT
Start: 2025-04-04 | End: 2025-04-07

## 2025-04-04 RX ORDER — SIMETHICONE 80 MG
80 TABLET,CHEWABLE ORAL EVERY 8 HOURS
Refills: 0 | Status: DISCONTINUED | OUTPATIENT
Start: 2025-04-04 | End: 2025-04-04

## 2025-04-04 RX ORDER — AMOXICILLIN AND CLAVULANATE POTASSIUM 500; 125 MG/1; MG/1
1 TABLET, FILM COATED ORAL EVERY 12 HOURS
Refills: 0 | Status: DISCONTINUED | OUTPATIENT
Start: 2025-04-04 | End: 2025-04-07

## 2025-04-04 RX ADMIN — OXYCODONE HYDROCHLORIDE 5 MILLIGRAM(S): 30 TABLET ORAL at 14:28

## 2025-04-04 RX ADMIN — Medication 400 MILLIGRAM(S): at 18:19

## 2025-04-04 RX ADMIN — HEPARIN SODIUM 5000 UNIT(S): 1000 INJECTION INTRAVENOUS; SUBCUTANEOUS at 22:42

## 2025-04-04 RX ADMIN — Medication 2 CAPSULE(S): at 07:22

## 2025-04-04 RX ADMIN — Medication 4 MILLIGRAM(S): at 14:29

## 2025-04-04 RX ADMIN — Medication 650 MILLIGRAM(S): at 11:29

## 2025-04-04 RX ADMIN — Medication 20 MILLIEQUIVALENT(S): at 07:54

## 2025-04-04 RX ADMIN — Medication 40 MILLIGRAM(S): at 10:09

## 2025-04-04 RX ADMIN — HEPARIN SODIUM 5000 UNIT(S): 1000 INJECTION INTRAVENOUS; SUBCUTANEOUS at 14:28

## 2025-04-04 RX ADMIN — LIDOCAINE HYDROCHLORIDE 1 PATCH: 20 JELLY TOPICAL at 12:06

## 2025-04-04 RX ADMIN — Medication 650 MILLIGRAM(S): at 07:00

## 2025-04-04 RX ADMIN — GABAPENTIN 100 MILLIGRAM(S): 400 CAPSULE ORAL at 06:00

## 2025-04-04 RX ADMIN — Medication 2 CAPSULE(S): at 11:28

## 2025-04-04 RX ADMIN — GABAPENTIN 100 MILLIGRAM(S): 400 CAPSULE ORAL at 22:42

## 2025-04-04 RX ADMIN — Medication 650 MILLIGRAM(S): at 06:01

## 2025-04-04 RX ADMIN — GABAPENTIN 100 MILLIGRAM(S): 400 CAPSULE ORAL at 14:46

## 2025-04-04 RX ADMIN — Medication 650 MILLIGRAM(S): at 00:23

## 2025-04-04 RX ADMIN — Medication 650 MILLIGRAM(S): at 18:19

## 2025-04-04 RX ADMIN — AMOXICILLIN AND CLAVULANATE POTASSIUM 1 TABLET(S): 500; 125 TABLET, FILM COATED ORAL at 14:28

## 2025-04-04 RX ADMIN — Medication 25 GRAM(S): at 06:01

## 2025-04-04 RX ADMIN — Medication 80 MILLIGRAM(S): at 12:07

## 2025-04-04 RX ADMIN — HEPARIN SODIUM 5000 UNIT(S): 1000 INJECTION INTRAVENOUS; SUBCUTANEOUS at 06:01

## 2025-04-04 RX ADMIN — Medication 400 MILLIGRAM(S): at 10:09

## 2025-04-04 RX ADMIN — Medication 2 CAPSULE(S): at 18:20

## 2025-04-04 NOTE — PROGRESS NOTE ADULT - ASSESSMENT
60yo F with PMH of HTN and no significant PSx found to have a 3cm polypoid lesion of the appendiceal orifice on screening colonoscopy with 3cm pancreatic head cyst concerning for neuroendocrine tumor on f/u CT scan. Subsequent EGD/EUS confirmed neuroendocrine pathology. Now s/p open whipple and right hemicolectomy on 3/26.    Care per primary team

## 2025-04-04 NOTE — PROGRESS NOTE ADULT - SUBJECTIVE AND OBJECTIVE BOX
POST-OP DAY: #9 s/p open whipple, R hemicolectomy      SUBJECTIVE: Patient seen and examined bedside by chief resident on AM rounds. Pt reports better pain control. +flatus/+BMs. Reports drainage around SEAN site. Otherwise, no acute complaints. Denies any nausea/vomiting.     amoxicillin  875 milliGRAM(s)/clavulanate 1 Tablet(s) Oral every 12 hours  heparin   Injectable 5000 Unit(s) SubCutaneous every 8 hours    MEDICATIONS  (PRN):  bisacodyl Suppository 10 milliGRAM(s) Rectal daily PRN Constipation  naloxone Injectable 0.1 milliGRAM(s) IV Push every 3 minutes PRN For ANY of the following changes in patient status:  A. RR LESS THAN 10 breaths per minute, B. Oxygen saturation LESS THAN 90%, C. Sedation score of 6  ondansetron Injectable 4 milliGRAM(s) IV Push every 6 hours PRN Nausea  oxyCODONE    IR 2.5 milliGRAM(s) Oral every 6 hours PRN Moderate Pain (4 - 6)  oxyCODONE    IR 5 milliGRAM(s) Oral every 6 hours PRN Severe Pain (7 - 10)  senna 1 Tablet(s) Oral daily PRN Constipation      I&O's Detail    03 Apr 2025 07:01  -  04 Apr 2025 07:00  --------------------------------------------------------  IN:    Lactated Ringers: 1125 mL    Oral Fluid: 250 mL  Total IN: 1375 mL    OUT:    Bulb (mL): 95 mL    Voided (mL): 1250 mL  Total OUT: 1345 mL    Total NET: 30 mL          Vital Signs Last 24 Hrs  T(C): 36.7 (04 Apr 2025 04:44), Max: 36.8 (03 Apr 2025 18:12)  T(F): 98 (04 Apr 2025 04:44), Max: 98.3 (03 Apr 2025 18:12)  HR: 88 (04 Apr 2025 04:00) (84 - 96)  BP: 120/65 (04 Apr 2025 04:00) (103/56 - 120/65)  BP(mean): 87 (04 Apr 2025 04:00) (73 - 87)  RR: 17 (04 Apr 2025 04:00) (16 - 18)  SpO2: 98% (04 Apr 2025 04:00) (97% - 100%)    Parameters below as of 04 Apr 2025 04:00  Patient On (Oxygen Delivery Method): room air        General: NAD, resting comfortably in bed  Pulm: Nonlabored breathing, no respiratory distress  Abd: soft, nondistended, (+) mild RUQ TTP. incisions c/d/i. SEAN with serous output, not holding suction   Extrem: WWP, no edema, SCDs in place    LABS:                        8.7    8.88  )-----------( 326      ( 04 Apr 2025 05:30 )             28.6     04-04    138  |  104  |  13  ----------------------------<  112[H]  3.8   |  23  |  0.39[L]    Ca    8.4      04 Apr 2025 05:30  Phos  3.4     04-04  Mg     2.0     04-04        Urinalysis Basic - ( 04 Apr 2025 05:30 )    Color: x / Appearance: x / SG: x / pH: x  Gluc: 112 mg/dL / Ketone: x  / Bili: x / Urobili: x   Blood: x / Protein: x / Nitrite: x   Leuk Esterase: x / RBC: x / WBC x   Sq Epi: x / Non Sq Epi: x / Bacteria: x        RADIOLOGY & ADDITIONAL STUDIES:  CT Abdomen and Pelvis w/ Oral Cont and w/ IV Cont:   ACC: [id number] EXAM:  CT ABDOMEN AND PELVIS OC IC   ORDERED BY: CLARISSA MARQUEZ     PROCEDURE DATE:  04/02/2025          INTERPRETATION:  CLINICAL INFORMATION: Postop abdominal pain    COMPARISON: No recent studies available for comparison. Preoperative PET   CT from 3/10/2025 and 1/13/2025 are available    CONTRAST/COMPLICATIONS:  IV Contrast: Isovue 370  90 cc administered   10 cc discarded  Oral Contrast: Omnipaque 300  .    PROCEDURE:  CT of the Abdomen and Pelvis was performed.  Sagittal and coronal reformats were performed.    FINDINGS:  LOWER CHEST: Trace left pleural effusion. Mild bibasilar focal atelectasis    LIVER: Within normal limits.  BILE DUCTS: Normal caliber.  GALLBLADDER: Postcholecystectomy  SPLEEN: Within normal limits.  PANCREAS: Post Whipple procedure. A pancreaticojejunostomy stent in   place. Gastrojejunostomy intact. Limited evaluation of the   choledochojejunostomy. No bowel obstruction.  ADRENALS: Within normal limits.  KIDNEYS/URETERS: Within normal limits.    BLADDER: Dependent intravesicular air, likely postprocedural  REPRODUCTIVE ORGANS: Stable    BOWEL: No bowel obstruction.  PERITONEUM/RETROPERITONEUM: Scattered punctate foci of free   intraperitoneal air are noted. A small pocket of loculated fluid   containing punctate foci of air is noted anterior to the pancreatic body,   measuring approximately 3.8 x 2.3 cm (3:42). A smaller loculated fluid   collection slightly anterior and to the left of this collection is noted,   measuring approximately 1.3 x 3.4 cm (3:41). A third loculated collection   is seen posterior and inferior to the pancreaticojejunostomy loop of   jejunum (3:52).  VESSELS: Within normal limits.  LYMPH NODES: No lymphadenopathy.  ABDOMINAL WALL: Postsurgical changes.  BONES: Degenerative changes.    IMPRESSION:  1.  Post Whipple procedure. Scattered loculated collections in the   postsurgical bed. One of which contains punctate foci of air. These may   be postoperative seromas/hematomas, however, cannot exclude small   abscesses.  2.  Scattered punctate foci of free intraperitoneal air, likely   postprocedural.    --- End of Report ---            KAITY CHERRY MD; Attending Radiologist  This document has been electronically signed. Apr 2 2025  1:58PM (04-02-25 @ 11:48)

## 2025-04-04 NOTE — PROGRESS NOTE ADULT - ASSESSMENT
60yo F with PMH of HTN and no significant PSx found to have a 3cm polypoid lesion of the appendiceal orifice on screening colonoscopy with 3cm pancreatic head cyst concerning for neuroendocrine tumor on f/u CT scan. Subsequent EGD/EUS confirmed neuroendocrine pathology. Now s/p open whipple and right hemicolectomy on 3/26.    LFD + ensures/IVF@75  Whipple Pathway  Pain and nausea control  Creon  zosyn  R SEAN drain   Protonix 40mg IV qd  HSQ/SCDs/IS/OOBA  AM labs  Dispo: [facility] 58yo F with PMH of HTN and no significant PSx found to have a 3cm polypoid lesion of the appendiceal orifice on screening colonoscopy with 3cm pancreatic head cyst concerning for neuroendocrine tumor on f/u CT scan. Subsequent EGD/EUS confirmed neuroendocrine pathology. Now s/p open whipple and right hemicolectomy on 3/26.    LFD + ensures/IVF@75  Whipple Pathway  Pain and nausea control  Creon  zosyn --> augmentin  R SEAN drain, dc today  Protonix 40mg IV qd  HSQ/SCDs/IS/OOBA  AM labs

## 2025-04-04 NOTE — PROGRESS NOTE ADULT - SUBJECTIVE AND OBJECTIVE BOX
INTERVAL HPI/OVERNIGHT EVENTS: RVP neg, ooba, SEAN site leaking, reinforced.     STATUS POST:  3/26: s/p open whipple and right hemicolectomy. , IVF 3000,     POST OPERATIVE DAY #: 9    SUBJECTIVE:     MEDICATIONS  (STANDING):  acetaminophen     Tablet .. 650 milliGRAM(s) Oral every 6 hours  gabapentin 100 milliGRAM(s) Oral every 8 hours  heparin   Injectable 5000 Unit(s) SubCutaneous every 8 hours  ibuprofen  Tablet. 400 milliGRAM(s) Oral every 6 hours  lactated ringers. 1000 milliLiter(s) (75 mL/Hr) IV Continuous <Continuous>  pancrelipase  (CREON 24,000 Lipase Units) 2 Capsule(s) Oral three times a day with meals  pantoprazole  Injectable 40 milliGRAM(s) IV Push daily  piperacillin/tazobactam IVPB.. 3.375 Gram(s) IV Intermittent every 8 hours    MEDICATIONS  (PRN):  bisacodyl Suppository 10 milliGRAM(s) Rectal daily PRN Constipation  naloxone Injectable 0.1 milliGRAM(s) IV Push every 3 minutes PRN For ANY of the following changes in patient status:  A. RR LESS THAN 10 breaths per minute, B. Oxygen saturation LESS THAN 90%, C. Sedation score of 6  ondansetron Injectable 4 milliGRAM(s) IV Push every 6 hours PRN Nausea  oxyCODONE    IR 2.5 milliGRAM(s) Oral every 6 hours PRN Moderate Pain (4 - 6)  oxyCODONE    IR 5 milliGRAM(s) Oral every 6 hours PRN Severe Pain (7 - 10)  senna 1 Tablet(s) Oral daily PRN Constipation      Vital Signs Last 24 Hrs  T(C): 36.7 (04 Apr 2025 04:44), Max: 36.8 (03 Apr 2025 18:12)  T(F): 98 (04 Apr 2025 04:44), Max: 98.3 (03 Apr 2025 18:12)  HR: 88 (04 Apr 2025 04:00) (84 - 96)  BP: 120/65 (04 Apr 2025 04:00) (103/56 - 120/65)  BP(mean): 87 (04 Apr 2025 04:00) (73 - 87)  RR: 17 (04 Apr 2025 04:00) (16 - 18)  SpO2: 98% (04 Apr 2025 04:00) (97% - 100%)    Parameters below as of 04 Apr 2025 04:00  Patient On (Oxygen Delivery Method): room air        PHYSICAL EXAM:    Constitutional: A&Ox3, nad  Respiratory: non labored breathing, no respiratory distress  Cardiovascular: NSR, RRR  Gastrointestinal:                 Incision:  Genitourinary: voiding  Extremities: (-) edema, wwp, SCDs in place                  I&O's Detail    02 Apr 2025 07:01  -  03 Apr 2025 07:00  --------------------------------------------------------  IN:    IV PiggyBack: 200 mL    Oral Fluid: 650 mL  Total IN: 850 mL    OUT:    Bulb (mL): 225 mL    Voided (mL): 1150 mL  Total OUT: 1375 mL    Total NET: -525 mL      03 Apr 2025 07:01  -  04 Apr 2025 06:09  --------------------------------------------------------  IN:    Lactated Ringers: 1050 mL    Oral Fluid: 250 mL  Total IN: 1300 mL    OUT:    Bulb (mL): 95 mL    Voided (mL): 1150 mL  Total OUT: 1245 mL    Total NET: 55 mL          LABS:                        9.8    15.23 )-----------( 338      ( 03 Apr 2025 05:30 )             31.5     04-03    139  |  99  |  16  ----------------------------<  115[H]  3.5   |  25  |  0.44[L]    Ca    8.7      03 Apr 2025 05:30  Phos  3.1     04-03  Mg     2.4     04-03        Urinalysis Basic - ( 03 Apr 2025 05:30 )    Color: x / Appearance: x / SG: x / pH: x  Gluc: 115 mg/dL / Ketone: x  / Bili: x / Urobili: x   Blood: x / Protein: x / Nitrite: x   Leuk Esterase: x / RBC: x / WBC x   Sq Epi: x / Non Sq Epi: x / Bacteria: x        RADIOLOGY & ADDITIONAL STUDIES: INTERVAL HPI/OVERNIGHT EVENTS: RVP neg, ooba, SEAN site leaking, reinforced.     STATUS POST:  3/26: s/p open whipple and right hemicolectomy. , IVF 3000,     POST OPERATIVE DAY #: 9    SUBJECTIVE: Patient reports feeling a bit better, having bowel movements and passing little gas. Denies any nausea or vomiting.     MEDICATIONS  (STANDING):  acetaminophen     Tablet .. 650 milliGRAM(s) Oral every 6 hours  gabapentin 100 milliGRAM(s) Oral every 8 hours  heparin   Injectable 5000 Unit(s) SubCutaneous every 8 hours  ibuprofen  Tablet. 400 milliGRAM(s) Oral every 6 hours  lactated ringers. 1000 milliLiter(s) (75 mL/Hr) IV Continuous <Continuous>  pancrelipase  (CREON 24,000 Lipase Units) 2 Capsule(s) Oral three times a day with meals  pantoprazole  Injectable 40 milliGRAM(s) IV Push daily  piperacillin/tazobactam IVPB.. 3.375 Gram(s) IV Intermittent every 8 hours    MEDICATIONS  (PRN):  bisacodyl Suppository 10 milliGRAM(s) Rectal daily PRN Constipation  naloxone Injectable 0.1 milliGRAM(s) IV Push every 3 minutes PRN For ANY of the following changes in patient status:  A. RR LESS THAN 10 breaths per minute, B. Oxygen saturation LESS THAN 90%, C. Sedation score of 6  ondansetron Injectable 4 milliGRAM(s) IV Push every 6 hours PRN Nausea  oxyCODONE    IR 2.5 milliGRAM(s) Oral every 6 hours PRN Moderate Pain (4 - 6)  oxyCODONE    IR 5 milliGRAM(s) Oral every 6 hours PRN Severe Pain (7 - 10)  senna 1 Tablet(s) Oral daily PRN Constipation      Vital Signs Last 24 Hrs  T(C): 36.7 (04 Apr 2025 04:44), Max: 36.8 (03 Apr 2025 18:12)  T(F): 98 (04 Apr 2025 04:44), Max: 98.3 (03 Apr 2025 18:12)  HR: 88 (04 Apr 2025 04:00) (84 - 96)  BP: 120/65 (04 Apr 2025 04:00) (103/56 - 120/65)  BP(mean): 87 (04 Apr 2025 04:00) (73 - 87)  RR: 17 (04 Apr 2025 04:00) (16 - 18)  SpO2: 98% (04 Apr 2025 04:00) (97% - 100%)    Parameters below as of 04 Apr 2025 04:00  Patient On (Oxygen Delivery Method): room air        PHYSICAL EXAM:    Constitutional: A&Ox3, nad  Respiratory: non labored breathing, no respiratory distress  Cardiovascular: NSR, RRR  Gastrointestinal: abd soft, mild TTP in RUQ, ND, SEAN with serous outpt not holding suction                 Incision: C/D/I  Genitourinary: voiding  Extremities: (-) edema, wwp, SCDs in place                  I&O's Detail    02 Apr 2025 07:01  -  03 Apr 2025 07:00  --------------------------------------------------------  IN:    IV PiggyBack: 200 mL    Oral Fluid: 650 mL  Total IN: 850 mL    OUT:    Bulb (mL): 225 mL    Voided (mL): 1150 mL  Total OUT: 1375 mL    Total NET: -525 mL      03 Apr 2025 07:01  -  04 Apr 2025 06:09  --------------------------------------------------------  IN:    Lactated Ringers: 1050 mL    Oral Fluid: 250 mL  Total IN: 1300 mL    OUT:    Bulb (mL): 95 mL    Voided (mL): 1150 mL  Total OUT: 1245 mL    Total NET: 55 mL          LABS:                        9.8    15.23 )-----------( 338      ( 03 Apr 2025 05:30 )             31.5     04-03    139  |  99  |  16  ----------------------------<  115[H]  3.5   |  25  |  0.44[L]    Ca    8.7      03 Apr 2025 05:30  Phos  3.1     04-03  Mg     2.4     04-03        Urinalysis Basic - ( 03 Apr 2025 05:30 )    Color: x / Appearance: x / SG: x / pH: x  Gluc: 115 mg/dL / Ketone: x  / Bili: x / Urobili: x   Blood: x / Protein: x / Nitrite: x   Leuk Esterase: x / RBC: x / WBC x   Sq Epi: x / Non Sq Epi: x / Bacteria: x        RADIOLOGY & ADDITIONAL STUDIES:

## 2025-04-05 LAB
ANION GAP SERPL CALC-SCNC: 8 MMOL/L — SIGNIFICANT CHANGE UP (ref 5–17)
BASOPHILS # BLD AUTO: 0.02 K/UL — SIGNIFICANT CHANGE UP (ref 0–0.2)
BASOPHILS NFR BLD AUTO: 0.2 % — SIGNIFICANT CHANGE UP (ref 0–2)
BUN SERPL-MCNC: 11 MG/DL — SIGNIFICANT CHANGE UP (ref 7–23)
CALCIUM SERPL-MCNC: 8.9 MG/DL — SIGNIFICANT CHANGE UP (ref 8.4–10.5)
CHLORIDE SERPL-SCNC: 102 MMOL/L — SIGNIFICANT CHANGE UP (ref 96–108)
CO2 SERPL-SCNC: 28 MMOL/L — SIGNIFICANT CHANGE UP (ref 22–31)
CREAT SERPL-MCNC: 0.5 MG/DL — SIGNIFICANT CHANGE UP (ref 0.5–1.3)
EGFR: 108 ML/MIN/1.73M2 — SIGNIFICANT CHANGE UP
EGFR: 108 ML/MIN/1.73M2 — SIGNIFICANT CHANGE UP
EOSINOPHIL # BLD AUTO: 0.1 K/UL — SIGNIFICANT CHANGE UP (ref 0–0.5)
EOSINOPHIL NFR BLD AUTO: 1.2 % — SIGNIFICANT CHANGE UP (ref 0–6)
GLUCOSE SERPL-MCNC: 94 MG/DL — SIGNIFICANT CHANGE UP (ref 70–99)
HCT VFR BLD CALC: 26.6 % — LOW (ref 34.5–45)
HGB BLD-MCNC: 8.3 G/DL — LOW (ref 11.5–15.5)
IMM GRANULOCYTES NFR BLD AUTO: 0.8 % — SIGNIFICANT CHANGE UP (ref 0–0.9)
LYMPHOCYTES # BLD AUTO: 0.62 K/UL — LOW (ref 1–3.3)
LYMPHOCYTES # BLD AUTO: 7.5 % — LOW (ref 13–44)
MAGNESIUM SERPL-MCNC: 2.1 MG/DL — SIGNIFICANT CHANGE UP (ref 1.6–2.6)
MCHC RBC-ENTMCNC: 23.5 PG — LOW (ref 27–34)
MCHC RBC-ENTMCNC: 31.2 G/DL — LOW (ref 32–36)
MCV RBC AUTO: 75.4 FL — LOW (ref 80–100)
MONOCYTES # BLD AUTO: 0.85 K/UL — SIGNIFICANT CHANGE UP (ref 0–0.9)
MONOCYTES NFR BLD AUTO: 10.3 % — SIGNIFICANT CHANGE UP (ref 2–14)
NEUTROPHILS # BLD AUTO: 6.63 K/UL — SIGNIFICANT CHANGE UP (ref 1.8–7.4)
NEUTROPHILS NFR BLD AUTO: 80 % — HIGH (ref 43–77)
NRBC BLD AUTO-RTO: 0 /100 WBCS — SIGNIFICANT CHANGE UP (ref 0–0)
PHOSPHATE SERPL-MCNC: 4 MG/DL — SIGNIFICANT CHANGE UP (ref 2.5–4.5)
PLATELET # BLD AUTO: 339 K/UL — SIGNIFICANT CHANGE UP (ref 150–400)
POTASSIUM SERPL-MCNC: 4.1 MMOL/L — SIGNIFICANT CHANGE UP (ref 3.5–5.3)
POTASSIUM SERPL-SCNC: 4.1 MMOL/L — SIGNIFICANT CHANGE UP (ref 3.5–5.3)
RBC # BLD: 3.53 M/UL — LOW (ref 3.8–5.2)
RBC # FLD: 13.8 % — SIGNIFICANT CHANGE UP (ref 10.3–14.5)
SODIUM SERPL-SCNC: 138 MMOL/L — SIGNIFICANT CHANGE UP (ref 135–145)
WBC # BLD: 8.29 K/UL — SIGNIFICANT CHANGE UP (ref 3.8–10.5)
WBC # FLD AUTO: 8.29 K/UL — SIGNIFICANT CHANGE UP (ref 3.8–10.5)

## 2025-04-05 RX ORDER — LIDOCAINE HYDROCHLORIDE 20 MG/ML
1 JELLY TOPICAL ONCE
Refills: 0 | Status: COMPLETED | OUTPATIENT
Start: 2025-04-05 | End: 2025-04-05

## 2025-04-05 RX ADMIN — Medication 400 MILLIGRAM(S): at 00:21

## 2025-04-05 RX ADMIN — AMOXICILLIN AND CLAVULANATE POTASSIUM 1 TABLET(S): 500; 125 TABLET, FILM COATED ORAL at 02:43

## 2025-04-05 RX ADMIN — Medication 40 MILLIGRAM(S): at 13:05

## 2025-04-05 RX ADMIN — Medication 650 MILLIGRAM(S): at 13:05

## 2025-04-05 RX ADMIN — Medication 400 MILLIGRAM(S): at 06:12

## 2025-04-05 RX ADMIN — LIDOCAINE HYDROCHLORIDE 1 PATCH: 20 JELLY TOPICAL at 20:59

## 2025-04-05 RX ADMIN — HEPARIN SODIUM 5000 UNIT(S): 1000 INJECTION INTRAVENOUS; SUBCUTANEOUS at 14:46

## 2025-04-05 RX ADMIN — GABAPENTIN 100 MILLIGRAM(S): 400 CAPSULE ORAL at 14:46

## 2025-04-05 RX ADMIN — HEPARIN SODIUM 5000 UNIT(S): 1000 INJECTION INTRAVENOUS; SUBCUTANEOUS at 22:36

## 2025-04-05 RX ADMIN — Medication 650 MILLIGRAM(S): at 18:21

## 2025-04-05 RX ADMIN — Medication 400 MILLIGRAM(S): at 13:05

## 2025-04-05 RX ADMIN — Medication 2 CAPSULE(S): at 08:19

## 2025-04-05 RX ADMIN — Medication 650 MILLIGRAM(S): at 00:21

## 2025-04-05 RX ADMIN — AMOXICILLIN AND CLAVULANATE POTASSIUM 1 TABLET(S): 500; 125 TABLET, FILM COATED ORAL at 14:46

## 2025-04-05 RX ADMIN — LIDOCAINE HYDROCHLORIDE 1 PATCH: 20 JELLY TOPICAL at 00:33

## 2025-04-05 RX ADMIN — Medication 2 CAPSULE(S): at 13:18

## 2025-04-05 RX ADMIN — Medication 400 MILLIGRAM(S): at 18:21

## 2025-04-05 RX ADMIN — GABAPENTIN 100 MILLIGRAM(S): 400 CAPSULE ORAL at 22:36

## 2025-04-05 RX ADMIN — HEPARIN SODIUM 5000 UNIT(S): 1000 INJECTION INTRAVENOUS; SUBCUTANEOUS at 06:12

## 2025-04-05 RX ADMIN — Medication 80 MILLIGRAM(S): at 17:44

## 2025-04-05 RX ADMIN — Medication 650 MILLIGRAM(S): at 06:11

## 2025-04-05 RX ADMIN — Medication 80 MILLIGRAM(S): at 06:12

## 2025-04-05 RX ADMIN — GABAPENTIN 100 MILLIGRAM(S): 400 CAPSULE ORAL at 06:11

## 2025-04-05 RX ADMIN — Medication 2 CAPSULE(S): at 17:44

## 2025-04-05 NOTE — PROGRESS NOTE ADULT - SUBJECTIVE AND OBJECTIVE BOX
Pt seen and examined at bedside.  Attending coverage for Dr. Cisneros  59F with an unresectable polyp of the appendiceal orifice and concurrent pancreatic head NET now s/p Whipple and concurrent right colectomy, POD10  Pt c/o moderate abdominal discomfort  Tolerating diet without nausea or vomiting.   Having bowel function with flatus and stool.  AFVSS, UOP 1290, SEAN 205  Abd is soft, appropriately tender, nondistended. Well healing midline wound. SEAN with murky serous drainage.  Labs remain stable, and without leukocytosis or significant metabolic derangement.  Continue care as per [facility] service.  Follow up with Dr. Cisneros in 3 weeks.

## 2025-04-05 NOTE — PROGRESS NOTE ADULT - SUBJECTIVE AND OBJECTIVE BOX
INTERVAL HPI/OVERNIGHT EVENTS: regionalized, denies itching, SOB    STATUS POST:  3/26: s/p open whipple and right hemicolectomy. , IVF 3000,     POST OPERATIVE DAY #: 10    SUBJECTIVE: Pt is ambulating, tolerating diet without nausea or vomiting. Having full bowel function.    MEDICATIONS  (STANDING):  acetaminophen     Tablet .. 650 milliGRAM(s) Oral every 6 hours  amoxicillin  875 milliGRAM(s)/clavulanate 1 Tablet(s) Oral every 12 hours  gabapentin 100 milliGRAM(s) Oral every 8 hours  heparin   Injectable 5000 Unit(s) SubCutaneous every 8 hours  ibuprofen  Tablet. 400 milliGRAM(s) Oral every 6 hours  pancrelipase  (CREON 24,000 Lipase Units) 2 Capsule(s) Oral three times a day with meals  pantoprazole  Injectable 40 milliGRAM(s) IV Push daily  simethicone 80 milliGRAM(s) Chew every 12 hours    MEDICATIONS  (PRN):  bisacodyl Suppository 10 milliGRAM(s) Rectal daily PRN Constipation  naloxone Injectable 0.1 milliGRAM(s) IV Push every 3 minutes PRN For ANY of the following changes in patient status:  A. RR LESS THAN 10 breaths per minute, B. Oxygen saturation LESS THAN 90%, C. Sedation score of 6  ondansetron Injectable 4 milliGRAM(s) IV Push every 6 hours PRN Nausea and/or Vomiting  oxyCODONE    IR 2.5 milliGRAM(s) Oral every 6 hours PRN Moderate Pain (4 - 6)  oxyCODONE    IR 5 milliGRAM(s) Oral every 6 hours PRN Severe Pain (7 - 10)  senna 1 Tablet(s) Oral daily PRN Constipation      Vital Signs Last 24 Hrs  T(C): 36.6 (05 Apr 2025 04:38), Max: 37.2 (04 Apr 2025 21:08)  T(F): 97.8 (05 Apr 2025 04:38), Max: 99 (04 Apr 2025 21:08)  HR: 79 (05 Apr 2025 04:38) (79 - 90)  BP: 130/78 (05 Apr 2025 04:38) (113/60 - 130/78)  BP(mean): 86 (04 Apr 2025 17:35) (79 - 86)  RR: 17 (05 Apr 2025 04:38) (17 - 18)  SpO2: 99% (05 Apr 2025 04:38) (97% - 99%)    Parameters below as of 05 Apr 2025 04:38  Patient On (Oxygen Delivery Method): room air        PHYSICAL EXAM:    Constitutional: A&Ox3, nad  Respiratory: non labored breathing, no respiratory distress  Cardiovascular: NSR, RRR  Gastrointestinal: abd soft, NT, ND, SEAN with ostomy bag over it with milkier yellow output  Genitourinary: voiding  Extremities: (-) edema, wwp, SCDs in place            I&O's Detail    04 Apr 2025 07:01  -  05 Apr 2025 07:00  --------------------------------------------------------  IN:  Total IN: 0 mL    OUT:    Bulb (mL): 205 mL    Voided (mL): 1290 mL  Total OUT: 1495 mL    Total NET: -1495 mL          LABS:                        8.7    8.88  )-----------( 326      ( 04 Apr 2025 05:30 )             28.6     04-04    138  |  104  |  13  ----------------------------<  112[H]  3.8   |  23  |  0.39[L]    Ca    8.4      04 Apr 2025 05:30  Phos  3.4     04-04  Mg     2.0     04-04        Urinalysis Basic - ( 04 Apr 2025 05:30 )    Color: x / Appearance: x / SG: x / pH: x  Gluc: 112 mg/dL / Ketone: x  / Bili: x / Urobili: x   Blood: x / Protein: x / Nitrite: x   Leuk Esterase: x / RBC: x / WBC x   Sq Epi: x / Non Sq Epi: x / Bacteria: x        RADIOLOGY & ADDITIONAL STUDIES:

## 2025-04-05 NOTE — PROGRESS NOTE ADULT - ASSESSMENT
60yo F with PMH of HTN and no significant PSx found to have a 3cm polypoid lesion of the appendiceal orifice on screening colonoscopy with 3cm pancreatic head cyst concerning for neuroendocrine tumor on f/u CT scan. Subsequent EGD/EUS confirmed neuroendocrine pathology. Now s/p open whipple and right hemicolectomy on 3/26.    LFD + ensures  Whipple Pathway  Pain and nausea control  Creon  Augmentin  R SEAN drain   Protonix 40mg IV qd  HSQ/SCDs/IS/OOBA  AM labs  Dispo: [facility] 60yo F with PMH of HTN and no significant PSx found to have a 3cm polypoid lesion of the appendiceal orifice on screening colonoscopy with 3cm pancreatic head cyst concerning for neuroendocrine tumor on f/u CT scan. Subsequent EGD/EUS confirmed neuroendocrine pathology. Now s/p open whipple and right hemicolectomy on 3/26.    LFD + ensures  Whipple Pathway  Pain and nausea control  Creon  Augmentin  R SEAN drain   Protonix 40mg IV qd  HSQ/SCDs/IS/OOBA  NO AM labs  Dispo: [facility]

## 2025-04-06 RX ADMIN — Medication 2 CAPSULE(S): at 08:58

## 2025-04-06 RX ADMIN — Medication 80 MILLIGRAM(S): at 06:07

## 2025-04-06 RX ADMIN — GABAPENTIN 100 MILLIGRAM(S): 400 CAPSULE ORAL at 06:07

## 2025-04-06 RX ADMIN — GABAPENTIN 100 MILLIGRAM(S): 400 CAPSULE ORAL at 22:35

## 2025-04-06 RX ADMIN — Medication 650 MILLIGRAM(S): at 12:43

## 2025-04-06 RX ADMIN — LIDOCAINE HYDROCHLORIDE 1 PATCH: 20 JELLY TOPICAL at 07:59

## 2025-04-06 RX ADMIN — AMOXICILLIN AND CLAVULANATE POTASSIUM 1 TABLET(S): 500; 125 TABLET, FILM COATED ORAL at 14:55

## 2025-04-06 RX ADMIN — Medication 40 MILLIGRAM(S): at 12:50

## 2025-04-06 RX ADMIN — AMOXICILLIN AND CLAVULANATE POTASSIUM 1 TABLET(S): 500; 125 TABLET, FILM COATED ORAL at 02:40

## 2025-04-06 RX ADMIN — HEPARIN SODIUM 5000 UNIT(S): 1000 INJECTION INTRAVENOUS; SUBCUTANEOUS at 22:35

## 2025-04-06 RX ADMIN — HEPARIN SODIUM 5000 UNIT(S): 1000 INJECTION INTRAVENOUS; SUBCUTANEOUS at 14:55

## 2025-04-06 RX ADMIN — Medication 80 MILLIGRAM(S): at 17:57

## 2025-04-06 RX ADMIN — Medication 400 MILLIGRAM(S): at 17:58

## 2025-04-06 RX ADMIN — Medication 400 MILLIGRAM(S): at 12:43

## 2025-04-06 RX ADMIN — Medication 2 CAPSULE(S): at 12:42

## 2025-04-06 RX ADMIN — HEPARIN SODIUM 5000 UNIT(S): 1000 INJECTION INTRAVENOUS; SUBCUTANEOUS at 06:06

## 2025-04-06 RX ADMIN — Medication 650 MILLIGRAM(S): at 17:57

## 2025-04-06 RX ADMIN — GABAPENTIN 100 MILLIGRAM(S): 400 CAPSULE ORAL at 14:55

## 2025-04-06 RX ADMIN — Medication 2 CAPSULE(S): at 17:45

## 2025-04-06 RX ADMIN — LIDOCAINE HYDROCHLORIDE 1 PATCH: 20 JELLY TOPICAL at 08:00

## 2025-04-06 NOTE — DISCHARGE NOTE NURSING/CASE MANAGEMENT/SOCIAL WORK - NSDCPEFALRISK_GEN_ALL_CORE
For information on Fall & Injury Prevention, visit: [email or web address] OR  [email or web address] OR  https://www.cdc.gov/steadi/patient.html

## 2025-04-06 NOTE — PROGRESS NOTE ADULT - SUBJECTIVE AND OBJECTIVE BOX
INTERVAL HPI/OVERNIGHT EVENTS: lidocaine patch for back pain,     STATUS POST: 3/26: s/p open whipple and right hemicolectomy. , IVF 3000,      POST OPERATIVE DAY #: 11    SUBJECTIVE: Pt     MEDICATIONS  (STANDING):  acetaminophen     Tablet .. 650 milliGRAM(s) Oral every 6 hours  amoxicillin  875 milliGRAM(s)/clavulanate 1 Tablet(s) Oral every 12 hours  gabapentin 100 milliGRAM(s) Oral every 8 hours  heparin   Injectable 5000 Unit(s) SubCutaneous every 8 hours  ibuprofen  Tablet. 400 milliGRAM(s) Oral every 6 hours  pancrelipase  (CREON 24,000 Lipase Units) 2 Capsule(s) Oral three times a day with meals  pantoprazole  Injectable 40 milliGRAM(s) IV Push daily  simethicone 80 milliGRAM(s) Chew every 12 hours    MEDICATIONS  (PRN):  bisacodyl Suppository 10 milliGRAM(s) Rectal daily PRN Constipation  naloxone Injectable 0.1 milliGRAM(s) IV Push every 3 minutes PRN For ANY of the following changes in patient status:  A. RR LESS THAN 10 breaths per minute, B. Oxygen saturation LESS THAN 90%, C. Sedation score of 6  ondansetron Injectable 4 milliGRAM(s) IV Push every 6 hours PRN Nausea and/or Vomiting  oxyCODONE    IR 2.5 milliGRAM(s) Oral every 6 hours PRN Moderate Pain (4 - 6)  oxyCODONE    IR 5 milliGRAM(s) Oral every 6 hours PRN Severe Pain (7 - 10)  senna 1 Tablet(s) Oral daily PRN Constipation      Vital Signs Last 24 Hrs  T(C): 36.6 (06 Apr 2025 04:30), Max: 37.2 (05 Apr 2025 20:46)  T(F): 97.8 (06 Apr 2025 04:30), Max: 98.9 (05 Apr 2025 20:46)  HR: 80 (06 Apr 2025 04:30) (77 - 93)  BP: 124/70 (06 Apr 2025 04:30) (115/71 - 135/73)  BP(mean): --  RR: 17 (06 Apr 2025 04:30) (16 - 18)  SpO2: 98% (06 Apr 2025 04:30) (97% - 99%)    Parameters below as of 06 Apr 2025 04:30  Patient On (Oxygen Delivery Method): room air        PHYSICAL EXAM:    Constitutional: A&Ox3, nad  Respiratory: non labored breathing, no respiratory distress  Cardiovascular: NSR, RRR  Gastrointestinal:                 Incision:  Genitourinary: voiding  Extremities: (-) edema, wwp, SCDs in place                  I&O's Detail    04 Apr 2025 07:01  -  05 Apr 2025 07:00  --------------------------------------------------------  IN:  Total IN: 0 mL    OUT:    Bulb (mL): 205 mL    Voided (mL): 1290 mL  Total OUT: 1495 mL    Total NET: -1495 mL      05 Apr 2025 07:01  -  06 Apr 2025 05:38  --------------------------------------------------------  IN:  Total IN: 0 mL    OUT:    Bulb (mL): 160 mL    Voided (mL): 1425 mL  Total OUT: 1585 mL    Total NET: -1585 mL          LABS:                        8.3    8.29  )-----------( 339      ( 05 Apr 2025 05:30 )             26.6     04-05    138  |  102  |  11  ----------------------------<  94  4.1   |  28  |  0.50    Ca    8.9      05 Apr 2025 05:30  Phos  4.0     04-05  Mg     2.1     04-05        Urinalysis Basic - ( 05 Apr 2025 05:30 )    Color: x / Appearance: x / SG: x / pH: x  Gluc: 94 mg/dL / Ketone: x  / Bili: x / Urobili: x   Blood: x / Protein: x / Nitrite: x   Leuk Esterase: x / RBC: x / WBC x   Sq Epi: x / Non Sq Epi: x / Bacteria: x        RADIOLOGY & ADDITIONAL STUDIES: INTERVAL HPI/OVERNIGHT EVENTS: lidocaine patch for back pain,     STATUS POST: 3/26: s/p open whipple and right hemicolectomy. , IVF 3000,      POST OPERATIVE DAY #: 11    SUBJECTIVE: Pt seen with senior resident. Complains of some back soreness. Has not been eating much due to abdominal discomfort. Is walking well. Denies nausea, vomiting.    MEDICATIONS  (STANDING):  acetaminophen     Tablet .. 650 milliGRAM(s) Oral every 6 hours  amoxicillin  875 milliGRAM(s)/clavulanate 1 Tablet(s) Oral every 12 hours  gabapentin 100 milliGRAM(s) Oral every 8 hours  heparin   Injectable 5000 Unit(s) SubCutaneous every 8 hours  ibuprofen  Tablet. 400 milliGRAM(s) Oral every 6 hours  pancrelipase  (CREON 24,000 Lipase Units) 2 Capsule(s) Oral three times a day with meals  pantoprazole  Injectable 40 milliGRAM(s) IV Push daily  simethicone 80 milliGRAM(s) Chew every 12 hours    MEDICATIONS  (PRN):  bisacodyl Suppository 10 milliGRAM(s) Rectal daily PRN Constipation  naloxone Injectable 0.1 milliGRAM(s) IV Push every 3 minutes PRN For ANY of the following changes in patient status:  A. RR LESS THAN 10 breaths per minute, B. Oxygen saturation LESS THAN 90%, C. Sedation score of 6  ondansetron Injectable 4 milliGRAM(s) IV Push every 6 hours PRN Nausea and/or Vomiting  oxyCODONE    IR 2.5 milliGRAM(s) Oral every 6 hours PRN Moderate Pain (4 - 6)  oxyCODONE    IR 5 milliGRAM(s) Oral every 6 hours PRN Severe Pain (7 - 10)  senna 1 Tablet(s) Oral daily PRN Constipation      Vital Signs Last 24 Hrs  T(C): 36.6 (06 Apr 2025 04:30), Max: 37.2 (05 Apr 2025 20:46)  T(F): 97.8 (06 Apr 2025 04:30), Max: 98.9 (05 Apr 2025 20:46)  HR: 80 (06 Apr 2025 04:30) (77 - 93)  BP: 124/70 (06 Apr 2025 04:30) (115/71 - 135/73)  BP(mean): --  RR: 17 (06 Apr 2025 04:30) (16 - 18)  SpO2: 98% (06 Apr 2025 04:30) (97% - 99%)    Parameters below as of 06 Apr 2025 04:30  Patient On (Oxygen Delivery Method): room air        PHYSICAL EXAM:    Constitutional: A&Ox3, nad  Respiratory: non labored breathing, no respiratory distress  Cardiovascular: NSR, RRR  Gastrointestinal:                 Incision: c/d/i                 Abdomen NT/ND                 SEAN outputting serous  Genitourinary: voiding  Extremities: (-) edema, wwp, SCDs in place                  I&O's Detail    04 Apr 2025 07:01  -  05 Apr 2025 07:00  --------------------------------------------------------  IN:  Total IN: 0 mL    OUT:    Bulb (mL): 205 mL    Voided (mL): 1290 mL  Total OUT: 1495 mL    Total NET: -1495 mL      05 Apr 2025 07:01  -  06 Apr 2025 05:38  --------------------------------------------------------  IN:  Total IN: 0 mL    OUT:    Bulb (mL): 160 mL    Voided (mL): 1425 mL  Total OUT: 1585 mL    Total NET: -1585 mL          LABS:                        8.3    8.29  )-----------( 339      ( 05 Apr 2025 05:30 )             26.6     04-05    138  |  102  |  11  ----------------------------<  94  4.1   |  28  |  0.50    Ca    8.9      05 Apr 2025 05:30  Phos  4.0     04-05  Mg     2.1     04-05        Urinalysis Basic - ( 05 Apr 2025 05:30 )    Color: x / Appearance: x / SG: x / pH: x  Gluc: 94 mg/dL / Ketone: x  / Bili: x / Urobili: x   Blood: x / Protein: x / Nitrite: x   Leuk Esterase: x / RBC: x / WBC x   Sq Epi: x / Non Sq Epi: x / Bacteria: x        RADIOLOGY & ADDITIONAL STUDIES:

## 2025-04-06 NOTE — DISCHARGE NOTE NURSING/CASE MANAGEMENT/SOCIAL WORK - PATIENT PORTAL LINK FT
You can access the FollowMyHealth Patient Portal offered by [facility] by registering at the following website: [email or web address]. By joining [facility]’s FollowMyHealth portal, you will also be able to view your health information using other applications (apps) compatible with our system.

## 2025-04-06 NOTE — DISCHARGE NOTE NURSING/CASE MANAGEMENT/SOCIAL WORK - FINANCIAL ASSISTANCE
[facility] provides services at a reduced cost to those who are determined to be eligible through [facility]’s financial assistance program. Information regarding [facility]’s financial assistance program can be found by going to [email or web address] or by calling [phone].

## 2025-04-06 NOTE — PROGRESS NOTE ADULT - SUBJECTIVE AND OBJECTIVE BOX
Pt seen and examined at bedside.  Attending coverage for Dr. Cisneros  59F with an unresectable polyp of the appendiceal orifice and concurrent pancreatic head NET now s/p Whipple and concurrent right colectomy, POD10  Pt c/o mostly back discomfort from the bed. Able to eat but poor appetite. Continued bowel function.  AFVSS, UOP 1425, SEAN 160  Abd is soft, appropriately tender, nondistended. Well healing midline wound. SEAN with murky serous drainage.  Lab holiday  Continue care as per [facility] service.  Follow up with Dr. Cisneros in 3 weeks.

## 2025-04-06 NOTE — PROGRESS NOTE ADULT - ASSESSMENT
60yo F with PMH of HTN and no significant PSx found to have a 3cm polypoid lesion of the appendiceal orifice on screening colonoscopy with 3cm pancreatic head cyst concerning for neuroendocrine tumor on f/u CT scan. Subsequent EGD/EUS confirmed neuroendocrine pathology. Now s/p open whipple and right hemicolectomy on 3/26.    LFD + ensures  Whipple Pathway  Pain and nausea control  Creon  Augmentin  R SEAN drain   Protonix 40mg IV qd  HSQ/SCDs/IS/OOBA  no AM labs  Dispo: [facility]

## 2025-04-06 NOTE — DISCHARGE NOTE NURSING/CASE MANAGEMENT/SOCIAL WORK - NSDCFUADDAPPT_GEN_ALL_CORE_FT
Please schedule a follow-up appointment with Dr. Shipley within 1-2 weeks of discharge from the hospital. You may reach his office at [phone] at your earliest convenience.

## 2025-04-07 VITALS
SYSTOLIC BLOOD PRESSURE: 130 MMHG | RESPIRATION RATE: 16 BRPM | OXYGEN SATURATION: 97 % | TEMPERATURE: 98 F | DIASTOLIC BLOOD PRESSURE: 75 MMHG | HEART RATE: 83 BPM

## 2025-04-07 PROBLEM — I10 ESSENTIAL (PRIMARY) HYPERTENSION: Chronic | Status: ACTIVE | Noted: 2025-03-25

## 2025-04-07 PROCEDURE — 82962 GLUCOSE BLOOD TEST: CPT

## 2025-04-07 PROCEDURE — 86850 RBC ANTIBODY SCREEN: CPT

## 2025-04-07 PROCEDURE — 88360 TUMOR IMMUNOHISTOCHEM/MANUAL: CPT

## 2025-04-07 PROCEDURE — 85025 COMPLETE CBC W/AUTO DIFF WBC: CPT

## 2025-04-07 PROCEDURE — 82330 ASSAY OF CALCIUM: CPT

## 2025-04-07 PROCEDURE — 82947 ASSAY GLUCOSE BLOOD QUANT: CPT

## 2025-04-07 PROCEDURE — 82150 ASSAY OF AMYLASE: CPT

## 2025-04-07 PROCEDURE — 88342 IMHCHEM/IMCYTCHM 1ST ANTB: CPT

## 2025-04-07 PROCEDURE — 80053 COMPREHEN METABOLIC PANEL: CPT

## 2025-04-07 PROCEDURE — C1889: CPT

## 2025-04-07 PROCEDURE — C9399: CPT

## 2025-04-07 PROCEDURE — 87637 SARSCOV2&INF A&B&RSV AMP PRB: CPT

## 2025-04-07 PROCEDURE — 86901 BLOOD TYPING SEROLOGIC RH(D): CPT

## 2025-04-07 PROCEDURE — 93005 ELECTROCARDIOGRAM TRACING: CPT

## 2025-04-07 PROCEDURE — 84132 ASSAY OF SERUM POTASSIUM: CPT

## 2025-04-07 PROCEDURE — 88304 TISSUE EXAM BY PATHOLOGIST: CPT

## 2025-04-07 PROCEDURE — 80048 BASIC METABOLIC PNL TOTAL CA: CPT

## 2025-04-07 PROCEDURE — 74177 CT ABD & PELVIS W/CONTRAST: CPT | Mod: MC

## 2025-04-07 PROCEDURE — 88341 IMHCHEM/IMCYTCHM EA ADD ANTB: CPT

## 2025-04-07 PROCEDURE — 83735 ASSAY OF MAGNESIUM: CPT

## 2025-04-07 PROCEDURE — 85027 COMPLETE CBC AUTOMATED: CPT

## 2025-04-07 PROCEDURE — 86900 BLOOD TYPING SEROLOGIC ABO: CPT

## 2025-04-07 PROCEDURE — 88305 TISSUE EXAM BY PATHOLOGIST: CPT

## 2025-04-07 PROCEDURE — 84295 ASSAY OF SERUM SODIUM: CPT

## 2025-04-07 PROCEDURE — 71045 X-RAY EXAM CHEST 1 VIEW: CPT

## 2025-04-07 PROCEDURE — 84100 ASSAY OF PHOSPHORUS: CPT

## 2025-04-07 PROCEDURE — 36415 COLL VENOUS BLD VENIPUNCTURE: CPT

## 2025-04-07 PROCEDURE — 82805 BLOOD GASES W/O2 SATURATION: CPT

## 2025-04-07 PROCEDURE — 88309 TISSUE EXAM BY PATHOLOGIST: CPT

## 2025-04-07 PROCEDURE — 85610 PROTHROMBIN TIME: CPT

## 2025-04-07 PROCEDURE — 85730 THROMBOPLASTIN TIME PARTIAL: CPT

## 2025-04-07 RX ORDER — LIPASE/PROTEASE/AMYLASE 10K-37.5K
2 CAPSULE,DELAYED RELEASE (ENTERIC COATED) ORAL
Qty: 180 | Refills: 0
Start: 2025-04-07 | End: 2025-05-06

## 2025-04-07 RX ORDER — AMOXICILLIN AND CLAVULANATE POTASSIUM 500; 125 MG/1; MG/1
1 TABLET, FILM COATED ORAL
Qty: 14 | Refills: 0
Start: 2025-04-07 | End: 2025-04-13

## 2025-04-07 RX ORDER — LIDOCAINE HYDROCHLORIDE 20 MG/ML
1 JELLY TOPICAL ONCE
Refills: 0 | Status: COMPLETED | OUTPATIENT
Start: 2025-04-07 | End: 2025-04-07

## 2025-04-07 RX ORDER — TRAMADOL HYDROCHLORIDE 50 MG/1
1 TABLET, FILM COATED ORAL
Qty: 42 | Refills: 0
Start: 2025-04-07 | End: 2025-04-13

## 2025-04-07 RX ORDER — LIPASE/PROTEASE/AMYLASE 10K-37.5K
2 CAPSULE,DELAYED RELEASE (ENTERIC COATED) ORAL
Qty: 12 | Refills: 0
Start: 2025-04-07 | End: 2025-04-08

## 2025-04-07 RX ORDER — SENNA 187 MG
1 TABLET ORAL
Qty: 7 | Refills: 0
Start: 2025-04-07 | End: 2025-04-13

## 2025-04-07 RX ORDER — IBUPROFEN 200 MG
1 TABLET ORAL
Qty: 28 | Refills: 0
Start: 2025-04-07 | End: 2025-04-13

## 2025-04-07 RX ORDER — DOCUSATE SODIUM 100 MG
1 CAPSULE ORAL
Qty: 14 | Refills: 0
Start: 2025-04-07 | End: 2025-04-13

## 2025-04-07 RX ORDER — GABAPENTIN 400 MG/1
1 CAPSULE ORAL
Qty: 21 | Refills: 0
Start: 2025-04-07 | End: 2025-04-13

## 2025-04-07 RX ORDER — LIPASE/PROTEASE/AMYLASE 10K-37.5K
1 CAPSULE,DELAYED RELEASE (ENTERIC COATED) ORAL
Qty: 90 | Refills: 0
Start: 2025-04-07 | End: 2025-05-06

## 2025-04-07 RX ORDER — OXYCODONE HYDROCHLORIDE 30 MG/1
1 TABLET ORAL
Qty: 16 | Refills: 0
Start: 2025-04-07 | End: 2025-04-10

## 2025-04-07 RX ADMIN — Medication 400 MILLIGRAM(S): at 06:25

## 2025-04-07 RX ADMIN — Medication 80 MILLIGRAM(S): at 06:24

## 2025-04-07 RX ADMIN — LIDOCAINE HYDROCHLORIDE 1 PATCH: 20 JELLY TOPICAL at 07:03

## 2025-04-07 RX ADMIN — LIDOCAINE HYDROCHLORIDE 1 PATCH: 20 JELLY TOPICAL at 06:07

## 2025-04-07 RX ADMIN — Medication 650 MILLIGRAM(S): at 06:25

## 2025-04-07 RX ADMIN — AMOXICILLIN AND CLAVULANATE POTASSIUM 1 TABLET(S): 500; 125 TABLET, FILM COATED ORAL at 02:47

## 2025-04-07 RX ADMIN — HEPARIN SODIUM 5000 UNIT(S): 1000 INJECTION INTRAVENOUS; SUBCUTANEOUS at 06:25

## 2025-04-07 RX ADMIN — GABAPENTIN 100 MILLIGRAM(S): 400 CAPSULE ORAL at 06:24

## 2025-04-07 RX ADMIN — Medication 2 CAPSULE(S): at 08:32

## 2025-04-07 NOTE — PROGRESS NOTE ADULT - PROVIDER SPECIALTY LIST ADULT
Colorectal Surgery
Surgery
Colorectal Surgery
Colorectal Surgery
Surgery
Colorectal Surgery
Colorectal Surgery
Surgery

## 2025-04-07 NOTE — PROGRESS NOTE ADULT - ASSESSMENT
58yo F with PMH of HTN and no significant PSx found to have a 3cm polypoid lesion of the appendiceal orifice on screening colonoscopy with 3cm pancreatic head cyst concerning for neuroendocrine tumor on f/u CT scan. Subsequent EGD/EUS confirmed neuroendocrine pathology. Now s/p open whipple and right hemicolectomy on 3/26.    LFD + ensures (Soft)  Whipple Pathway  Pain and nausea control  Creon  Augmentin  R SEAN drain   Protonix 40mg IV qd  HSQ/SCDs/IS/OOBA  no AM labs  Dispo: [facility] for drain care  58yo F with PMH of HTN and no significant PSx found to have a 3cm polypoid lesion of the appendiceal orifice on screening colonoscopy with 3cm pancreatic head cyst concerning for neuroendocrine tumor on f/u CT scan. Subsequent EGD/EUS confirmed neuroendocrine pathology. Now s/p open whipple and right hemicolectomy on 3/26. Pt is recovering well this AM and is ready for DC with [facility] for drain care.     LFD + ensures (Soft)  Whipple Pathway  Pain and nausea control  Creon  Augmentin  R SEAN drain   Protonix 40mg IV qd  HSQ/SCDs/IS/OOBA  no AM labs  Dispo: [facility] for drain care

## 2025-04-07 NOTE — PROGRESS NOTE ADULT - SUBJECTIVE AND OBJECTIVE BOX
SUBJECTIVE:      MEDICATIONS  (STANDING):  acetaminophen     Tablet .. 650 milliGRAM(s) Oral every 6 hours  amoxicillin  875 milliGRAM(s)/clavulanate 1 Tablet(s) Oral every 12 hours  gabapentin 100 milliGRAM(s) Oral every 8 hours  heparin   Injectable 5000 Unit(s) SubCutaneous every 8 hours  ibuprofen  Tablet. 400 milliGRAM(s) Oral every 6 hours  pancrelipase  (CREON 24,000 Lipase Units) 2 Capsule(s) Oral three times a day with meals  pantoprazole  Injectable 40 milliGRAM(s) IV Push daily  simethicone 80 milliGRAM(s) Chew every 12 hours    MEDICATIONS  (PRN):  bisacodyl Suppository 10 milliGRAM(s) Rectal daily PRN Constipation  naloxone Injectable 0.1 milliGRAM(s) IV Push every 3 minutes PRN For ANY of the following changes in patient status:  A. RR LESS THAN 10 breaths per minute, B. Oxygen saturation LESS THAN 90%, C. Sedation score of 6  ondansetron Injectable 4 milliGRAM(s) IV Push every 6 hours PRN Nausea and/or Vomiting  oxyCODONE    IR 2.5 milliGRAM(s) Oral every 6 hours PRN Moderate Pain (4 - 6)  oxyCODONE    IR 5 milliGRAM(s) Oral every 6 hours PRN Severe Pain (7 - 10)  senna 1 Tablet(s) Oral daily PRN Constipation      Vital Signs Last 24 Hrs  T(C): 36.8 (07 Apr 2025 04:45), Max: 37.4 (06 Apr 2025 12:45)  T(F): 98.3 (07 Apr 2025 04:45), Max: 99.4 (06 Apr 2025 12:45)  HR: 81 (07 Apr 2025 04:45) (75 - 94)  BP: 123/72 (07 Apr 2025 04:45) (115/67 - 134/78)  BP(mean): 89 (07 Apr 2025 04:45) (89 - 89)  RR: 17 (07 Apr 2025 04:45) (16 - 17)  SpO2: 97% (07 Apr 2025 04:45) (96% - 99%)    Parameters below as of 07 Apr 2025 04:45  Patient On (Oxygen Delivery Method): room air        PHYSICAL EXAM:      Constitutional: A&Ox3    Breasts:    Respiratory: non labored breathing, no respiratory distress    Cardiovascular: NSR, RRR    Gastrointestinal:                 Incision:    Genitourinary:    Extremities: (-) edema                  I&O's Detail    05 Apr 2025 07:01  -  06 Apr 2025 07:00  --------------------------------------------------------  IN:  Total IN: 0 mL    OUT:    Bulb (mL): 160 mL    Voided (mL): 1425 mL  Total OUT: 1585 mL    Total NET: -1585 mL      06 Apr 2025 07:01  -  07 Apr 2025 06:10  --------------------------------------------------------  IN:    Oral Fluid: 1025 mL  Total IN: 1025 mL    OUT:    Bulb (mL): 95 mL    Voided (mL): 0 mL  Total OUT: 95 mL    Total NET: 930 mL          LABS:                RADIOLOGY & ADDITIONAL STUDIES: SUBJECTIVE: Pt seen and examined at bedside with . She endorses feeling well overall aside from mild abdominal tightness. She denies pain, bloating, and has been tolerating diet without nausea or vomiting. She is aware of plan for DC today.     MEDICATIONS  (STANDING):  acetaminophen     Tablet .. 650 milliGRAM(s) Oral every 6 hours  amoxicillin  875 milliGRAM(s)/clavulanate 1 Tablet(s) Oral every 12 hours  gabapentin 100 milliGRAM(s) Oral every 8 hours  heparin   Injectable 5000 Unit(s) SubCutaneous every 8 hours  ibuprofen  Tablet. 400 milliGRAM(s) Oral every 6 hours  pancrelipase  (CREON 24,000 Lipase Units) 2 Capsule(s) Oral three times a day with meals  pantoprazole  Injectable 40 milliGRAM(s) IV Push daily  simethicone 80 milliGRAM(s) Chew every 12 hours    MEDICATIONS  (PRN):  bisacodyl Suppository 10 milliGRAM(s) Rectal daily PRN Constipation  naloxone Injectable 0.1 milliGRAM(s) IV Push every 3 minutes PRN For ANY of the following changes in patient status:  A. RR LESS THAN 10 breaths per minute, B. Oxygen saturation LESS THAN 90%, C. Sedation score of 6  ondansetron Injectable 4 milliGRAM(s) IV Push every 6 hours PRN Nausea and/or Vomiting  oxyCODONE    IR 2.5 milliGRAM(s) Oral every 6 hours PRN Moderate Pain (4 - 6)  oxyCODONE    IR 5 milliGRAM(s) Oral every 6 hours PRN Severe Pain (7 - 10)  senna 1 Tablet(s) Oral daily PRN Constipation      Vital Signs Last 24 Hrs  T(C): 36.8 (07 Apr 2025 04:45), Max: 37.4 (06 Apr 2025 12:45)  T(F): 98.3 (07 Apr 2025 04:45), Max: 99.4 (06 Apr 2025 12:45)  HR: 81 (07 Apr 2025 04:45) (75 - 94)  BP: 123/72 (07 Apr 2025 04:45) (115/67 - 134/78)  BP(mean): 89 (07 Apr 2025 04:45) (89 - 89)  RR: 17 (07 Apr 2025 04:45) (16 - 17)  SpO2: 97% (07 Apr 2025 04:45) (96% - 99%)    Parameters below as of 07 Apr 2025 04:45  Patient On (Oxygen Delivery Method): room air      PHYSICAL EXAM:  General: NAD, pt resting comfortably in bed  Pulm: No respiratory distress, nonlabored breathing, on room air  CVS: NSR, HDS  Abd: Soft, NT, ND      R SEAN (with ostomy appliance): SS  Extremities: WWP, no edema                    I&O's Detail    05 Apr 2025 07:01  -  06 Apr 2025 07:00  --------------------------------------------------------  IN:  Total IN: 0 mL    OUT:    Bulb (mL): 160 mL    Voided (mL): 1425 mL  Total OUT: 1585 mL    Total NET: -1585 mL      06 Apr 2025 07:01  -  07 Apr 2025 06:10  --------------------------------------------------------  IN:    Oral Fluid: 1025 mL  Total IN: 1025 mL    OUT:    Bulb (mL): 95 mL    Voided (mL): 0 mL  Total OUT: 95 mL    Total NET: 930 mL          LABS:                RADIOLOGY & ADDITIONAL STUDIES: SUBJECTIVE: Pt seen and examined at bedside with . She endorses feeling well overall aside from mild abdominal tightness. She denies pain, endorses minimal bloating, and has been tolerating diet without nausea or vomiting. She is aware of plan for DC today.     MEDICATIONS  (STANDING):  acetaminophen     Tablet .. 650 milliGRAM(s) Oral every 6 hours  amoxicillin  875 milliGRAM(s)/clavulanate 1 Tablet(s) Oral every 12 hours  gabapentin 100 milliGRAM(s) Oral every 8 hours  heparin   Injectable 5000 Unit(s) SubCutaneous every 8 hours  ibuprofen  Tablet. 400 milliGRAM(s) Oral every 6 hours  pancrelipase  (CREON 24,000 Lipase Units) 2 Capsule(s) Oral three times a day with meals  pantoprazole  Injectable 40 milliGRAM(s) IV Push daily  simethicone 80 milliGRAM(s) Chew every 12 hours    MEDICATIONS  (PRN):  bisacodyl Suppository 10 milliGRAM(s) Rectal daily PRN Constipation  naloxone Injectable 0.1 milliGRAM(s) IV Push every 3 minutes PRN For ANY of the following changes in patient status:  A. RR LESS THAN 10 breaths per minute, B. Oxygen saturation LESS THAN 90%, C. Sedation score of 6  ondansetron Injectable 4 milliGRAM(s) IV Push every 6 hours PRN Nausea and/or Vomiting  oxyCODONE    IR 2.5 milliGRAM(s) Oral every 6 hours PRN Moderate Pain (4 - 6)  oxyCODONE    IR 5 milliGRAM(s) Oral every 6 hours PRN Severe Pain (7 - 10)  senna 1 Tablet(s) Oral daily PRN Constipation      Vital Signs Last 24 Hrs  T(C): 36.8 (07 Apr 2025 04:45), Max: 37.4 (06 Apr 2025 12:45)  T(F): 98.3 (07 Apr 2025 04:45), Max: 99.4 (06 Apr 2025 12:45)  HR: 81 (07 Apr 2025 04:45) (75 - 94)  BP: 123/72 (07 Apr 2025 04:45) (115/67 - 134/78)  BP(mean): 89 (07 Apr 2025 04:45) (89 - 89)  RR: 17 (07 Apr 2025 04:45) (16 - 17)  SpO2: 97% (07 Apr 2025 04:45) (96% - 99%)    Parameters below as of 07 Apr 2025 04:45  Patient On (Oxygen Delivery Method): room air      PHYSICAL EXAM:  General: NAD, pt resting comfortably in bed  Pulm: No respiratory distress, nonlabored breathing, on room air  CVS: NSR, HDS  Abd: Soft, NT, ND      R SEAN (with ostomy appliance): SS  Extremities: WWP, no edema                    I&O's Detail    05 Apr 2025 07:01  -  06 Apr 2025 07:00  --------------------------------------------------------  IN:  Total IN: 0 mL    OUT:    Bulb (mL): 160 mL    Voided (mL): 1425 mL  Total OUT: 1585 mL    Total NET: -1585 mL      06 Apr 2025 07:01  -  07 Apr 2025 06:10  --------------------------------------------------------  IN:    Oral Fluid: 1025 mL  Total IN: 1025 mL    OUT:    Bulb (mL): 95 mL    Voided (mL): 0 mL  Total OUT: 95 mL    Total NET: 930 mL          LABS:                RADIOLOGY & ADDITIONAL STUDIES:

## 2025-04-07 NOTE — PROGRESS NOTE ADULT - ASSESSMENT
60yo F with PMH of HTN and no significant PSx found to have a 3cm polypoid lesion of the appendiceal orifice on screening colonoscopy with 3cm pancreatic head cyst concerning for neuroendocrine tumor on f/u CT scan. Subsequent EGD/EUS confirmed neuroendocrine pathology. Now s/p open whipple and right hemicolectomy on 3/26.    OK to dc patient per primary team  Please follow up with Dr. Cisneros 1-2 weeks after dc  Team 5 will continue to follow

## 2025-04-07 NOTE — PROGRESS NOTE ADULT - SUBJECTIVE AND OBJECTIVE BOX
INTERVAL HPI/OVERNIGHT EVENTS: nevin soft, -n/-v, +BF, pain controlled, minor bloating, SEAN with ostomy serous, +OOBA & missed voids    STATUS POST:  open whipple, R jessika    POST OPERATIVE DAY #: 12    SUBJECTIVE: Pt seen and examined by chief resident. Pt is doing well, sitting up in chair. Pain controlled. Reports some tiredness in her back and lightheadedness when standing but overall doing well. Diet tolerated. Ambulating out of bed. +F/+BM. No nausea or vomiting. No complaints at this time.    MEDICATIONS  (STANDING):  acetaminophen     Tablet .. 650 milliGRAM(s) Oral every 6 hours  amoxicillin  875 milliGRAM(s)/clavulanate 1 Tablet(s) Oral every 12 hours  gabapentin 100 milliGRAM(s) Oral every 8 hours  heparin   Injectable 5000 Unit(s) SubCutaneous every 8 hours  ibuprofen  Tablet. 400 milliGRAM(s) Oral every 6 hours  pancrelipase  (CREON 24,000 Lipase Units) 2 Capsule(s) Oral three times a day with meals  pantoprazole  Injectable 40 milliGRAM(s) IV Push daily  simethicone 80 milliGRAM(s) Chew every 12 hours    MEDICATIONS  (PRN):  bisacodyl Suppository 10 milliGRAM(s) Rectal daily PRN Constipation  naloxone Injectable 0.1 milliGRAM(s) IV Push every 3 minutes PRN For ANY of the following changes in patient status:  A. RR LESS THAN 10 breaths per minute, B. Oxygen saturation LESS THAN 90%, C. Sedation score of 6  ondansetron Injectable 4 milliGRAM(s) IV Push every 6 hours PRN Nausea and/or Vomiting  oxyCODONE    IR 2.5 milliGRAM(s) Oral every 6 hours PRN Moderate Pain (4 - 6)  oxyCODONE    IR 5 milliGRAM(s) Oral every 6 hours PRN Severe Pain (7 - 10)  senna 1 Tablet(s) Oral daily PRN Constipation      Vital Signs Last 24 Hrs  T(C): 36.8 (07 Apr 2025 04:45), Max: 37.4 (06 Apr 2025 12:45)  T(F): 98.3 (07 Apr 2025 04:45), Max: 99.4 (06 Apr 2025 12:45)  HR: 81 (07 Apr 2025 04:45) (75 - 94)  BP: 123/72 (07 Apr 2025 04:45) (115/67 - 134/78)  BP(mean): 89 (07 Apr 2025 04:45) (89 - 89)  RR: 17 (07 Apr 2025 04:45) (16 - 17)  SpO2: 97% (07 Apr 2025 04:45) (96% - 99%)    Parameters below as of 07 Apr 2025 04:45  Patient On (Oxygen Delivery Method): room air        PHYSICAL EXAM:  General: NAD, pt resting comfortably in bed  Pulm: No respiratory distress, nonlabored breathing, on room air  CVS: NSR, HDS  Abd: Soft, NT, ND      R SEAN (with ostomy appliance): SS  Extremities: WWP, no edema                    I&O's Detail    06 Apr 2025 07:01  -  07 Apr 2025 07:00  --------------------------------------------------------  IN:    Oral Fluid: 1025 mL  Total IN: 1025 mL    OUT:    Bulb (mL): 95 mL    Voided (mL): 0 mL  Total OUT: 95 mL    Total NET: 930 mL          LABS:                RADIOLOGY & ADDITIONAL STUDIES:

## 2025-04-07 NOTE — PROGRESS NOTE ADULT - NUTRITIONAL ASSESSMENT
This patient has been assessed with a concern for Malnutrition and has been determined to have a diagnosis/diagnoses of Mild protein-calorie malnutrition.    This patient is being managed with:   Diet Regular-  Low Fat (LOWFAT)  Supplement Feeding Modality:  Oral  Ensure Max Cans or Servings Per Day:  1       Frequency:  Three Times a day  Entered: Apr 1 2025  4:40PM  
This patient has been assessed with a concern for Malnutrition and has been determined to have a diagnosis/diagnoses of Mild protein-calorie malnutrition.    This patient is being managed with:   Diet Regular-  Low Fat (LOWFAT)  Supplement Feeding Modality:  Oral  Ensure Max Cans or Servings Per Day:  1       Frequency:  Three Times a day  Entered: Apr 1 2025  4:40PM  
This patient has been assessed with a concern for Malnutrition and has been determined to have a diagnosis/diagnoses of Mild protein-calorie malnutrition.    This patient is being managed with:   Diet Regular-  Low Fat (LOWFAT)  Supplement Feeding Modality:  Oral  Ensure Clear Cans or Servings Per Day:  1       Frequency:  Three Times a day  Entered: Apr 4 2025  7:14PM  
This patient has been assessed with a concern for Malnutrition and has been determined to have a diagnosis/diagnoses of Mild protein-calorie malnutrition.    This patient is being managed with:   Diet Regular-  Low Fat (LOWFAT)  Supplement Feeding Modality:  Oral  Ensure Max Cans or Servings Per Day:  1       Frequency:  Three Times a day  Entered: Apr 1 2025  4:40PM  
This patient has been assessed with a concern for Malnutrition and has been determined to have a diagnosis/diagnoses of Mild protein-calorie malnutrition.    This patient is being managed with:   Diet Soft and Bite Sized-  Low Fat (LOWFAT)  Supplement Feeding Modality:  Oral  Ensure Clear Cans or Servings Per Day:  1       Frequency:  Three Times a day  Entered: Apr 6 2025 12:26PM  
This patient has been assessed with a concern for Malnutrition and has been determined to have a diagnosis/diagnoses of Mild protein-calorie malnutrition.    This patient is being managed with:   Diet Regular-  Low Fat (LOWFAT)  Supplement Feeding Modality:  Oral  Ensure Max Cans or Servings Per Day:  1       Frequency:  Three Times a day  Entered: Apr 1 2025  4:40PM  
This patient has been assessed with a concern for Malnutrition and has been determined to have a diagnosis/diagnoses of Mild protein-calorie malnutrition.    This patient is being managed with:   Diet Regular-  Supplement Feeding Modality:  Oral  Ensure Max Cans or Servings Per Day:  1       Frequency:  Three Times a day  Entered: Apr 1 2025  6:59AM  
This patient has been assessed with a concern for Malnutrition and has been determined to have a diagnosis/diagnoses of Mild protein-calorie malnutrition.    This patient is being managed with:   Diet Soft and Bite Sized-  Low Fat (LOWFAT)  Supplement Feeding Modality:  Oral  Ensure Clear Cans or Servings Per Day:  1       Frequency:  Three Times a day  Entered: Apr 6 2025 12:26PM  
This patient has been assessed with a concern for Malnutrition and has been determined to have a diagnosis/diagnoses of Mild protein-calorie malnutrition.    This patient is being managed with:   Diet Regular-  Fiber/Residue Restricted (LOWFIBER)  Entered: Mar 31 2025  4:04PM  
This patient has been assessed with a concern for Malnutrition and has been determined to have a diagnosis/diagnoses of Mild protein-calorie malnutrition.    This patient is being managed with:   Diet Regular-  Low Fat (LOWFAT)  Supplement Feeding Modality:  Oral  Ensure Clear Cans or Servings Per Day:  1       Frequency:  Three Times a day  Entered: Apr 4 2025  7:14PM

## 2025-04-07 NOTE — PROGRESS NOTE ADULT - REASON FOR ADMISSION
[place]

## 2025-04-08 ENCOUNTER — APPOINTMENT (OUTPATIENT)
Dept: SURGICAL ONCOLOGY | Facility: CLINIC | Age: 60
End: 2025-04-08

## 2025-04-21 ENCOUNTER — LABORATORY RESULT (OUTPATIENT)
Age: 60
End: 2025-04-21

## 2025-04-21 ENCOUNTER — APPOINTMENT (OUTPATIENT)
Dept: SURGICAL ONCOLOGY | Facility: CLINIC | Age: 60
End: 2025-04-21
Payer: COMMERCIAL

## 2025-04-21 ENCOUNTER — APPOINTMENT (OUTPATIENT)
Dept: COLORECTAL SURGERY | Facility: CLINIC | Age: 60
End: 2025-04-21
Payer: COMMERCIAL

## 2025-04-21 VITALS
RESPIRATION RATE: 16 BRPM | WEIGHT: 109 LBS | HEART RATE: 80 BPM | BODY MASS INDEX: 18.61 KG/M2 | OXYGEN SATURATION: 99 % | SYSTOLIC BLOOD PRESSURE: 113 MMHG | TEMPERATURE: 208.58 F | DIASTOLIC BLOOD PRESSURE: 71 MMHG | HEIGHT: 64 IN

## 2025-04-21 VITALS
HEIGHT: 64 IN | TEMPERATURE: 206.96 F | HEART RATE: 80 BPM | DIASTOLIC BLOOD PRESSURE: 79 MMHG | SYSTOLIC BLOOD PRESSURE: 127 MMHG | BODY MASS INDEX: 19.29 KG/M2 | WEIGHT: 113 LBS

## 2025-04-21 DIAGNOSIS — D3A.8 OTHER BENIGN NEUROENDOCRINE TUMORS: ICD-10-CM

## 2025-04-21 DIAGNOSIS — K86.89 OTHER SPECIFIED DISEASES OF PANCREAS: ICD-10-CM

## 2025-04-21 DIAGNOSIS — K63.89 OTHER SPECIFIED DISEASES OF INTESTINE: ICD-10-CM

## 2025-04-21 DIAGNOSIS — T81.49XA INFECTION FOLLOWING A PROCEDURE, OTHER SURGICAL SITE, INITIAL ENCOUNTER: ICD-10-CM

## 2025-04-21 PROCEDURE — 99024 POSTOP FOLLOW-UP VISIT: CPT

## 2025-04-21 RX ORDER — IBUPROFEN 600 MG/1
600 TABLET, FILM COATED ORAL
Qty: 28 | Refills: 0 | Status: DISCONTINUED | COMMUNITY
Start: 2025-04-07 | End: 2025-04-21

## 2025-04-21 RX ORDER — PANTOPRAZOLE 40 MG/1
40 TABLET, DELAYED RELEASE ORAL
Qty: 30 | Refills: 0 | Status: DISCONTINUED | COMMUNITY
Start: 2025-04-07 | End: 2025-04-21

## 2025-04-21 RX ORDER — PANCRELIPASE 120000; 24000; 76000 [USP'U]/1; [USP'U]/1; [USP'U]/1
24000-76000 CAPSULE, DELAYED RELEASE PELLETS ORAL
Qty: 200 | Refills: 0 | Status: ACTIVE | COMMUNITY
Start: 2025-04-07

## 2025-04-21 RX ORDER — PANTOPRAZOLE 40 MG/1
40 TABLET, DELAYED RELEASE ORAL DAILY
Qty: 90 | Refills: 3 | Status: ACTIVE | COMMUNITY
Start: 2025-04-21 | End: 1900-01-01

## 2025-04-21 RX ORDER — SENNOSIDES 8.6 MG/1
8.6 CAPSULE, GELATIN COATED ORAL
Refills: 0 | Status: ACTIVE | COMMUNITY

## 2025-04-21 RX ORDER — OXYCODONE HYDROCHLORIDE 5 MG/1
5 CAPSULE ORAL
Refills: 0 | Status: ACTIVE | COMMUNITY

## 2025-04-21 RX ORDER — IBUPROFEN 600 MG/1
600 TABLET, FILM COATED ORAL
Qty: 21 | Refills: 0 | Status: ACTIVE | COMMUNITY
Start: 2025-04-21 | End: 1900-01-01

## 2025-04-21 RX ORDER — GABAPENTIN 100 MG/1
100 CAPSULE ORAL
Qty: 21 | Refills: 0 | Status: ACTIVE | COMMUNITY
Start: 2025-04-07

## 2025-04-25 ENCOUNTER — NON-APPOINTMENT (OUTPATIENT)
Age: 60
End: 2025-04-25

## 2025-05-12 ENCOUNTER — APPOINTMENT (OUTPATIENT)
Dept: SURGICAL ONCOLOGY | Facility: CLINIC | Age: 60
End: 2025-05-12
Payer: COMMERCIAL

## 2025-05-12 VITALS
DIASTOLIC BLOOD PRESSURE: 72 MMHG | RESPIRATION RATE: 16 BRPM | WEIGHT: 103 LBS | OXYGEN SATURATION: 99 % | TEMPERATURE: 208.4 F | HEIGHT: 64 IN | BODY MASS INDEX: 17.58 KG/M2 | SYSTOLIC BLOOD PRESSURE: 124 MMHG | HEART RATE: 79 BPM

## 2025-05-12 PROCEDURE — 99024 POSTOP FOLLOW-UP VISIT: CPT

## 2025-05-21 ENCOUNTER — APPOINTMENT (OUTPATIENT)
Dept: COLORECTAL SURGERY | Facility: CLINIC | Age: 60
End: 2025-05-21
Payer: COMMERCIAL

## 2025-05-21 VITALS
BODY MASS INDEX: 17.42 KG/M2 | TEMPERATURE: 208.58 F | WEIGHT: 102 LBS | HEIGHT: 64 IN | DIASTOLIC BLOOD PRESSURE: 77 MMHG | HEART RATE: 81 BPM | SYSTOLIC BLOOD PRESSURE: 127 MMHG

## 2025-05-21 DIAGNOSIS — K63.89 OTHER SPECIFIED DISEASES OF INTESTINE: ICD-10-CM

## 2025-05-21 DIAGNOSIS — D3A.8 OTHER BENIGN NEUROENDOCRINE TUMORS: ICD-10-CM

## 2025-05-21 PROCEDURE — 99024 POSTOP FOLLOW-UP VISIT: CPT

## 2025-06-11 RX ORDER — FAMOTIDINE 40 MG/1
40 TABLET, FILM COATED ORAL DAILY
Qty: 90 | Refills: 3 | Status: ACTIVE | COMMUNITY
Start: 2025-06-11 | End: 1900-01-01

## 2025-07-16 ENCOUNTER — APPOINTMENT (OUTPATIENT)
Dept: COLORECTAL SURGERY | Facility: CLINIC | Age: 60
End: 2025-07-16
Payer: COMMERCIAL

## 2025-07-16 VITALS
DIASTOLIC BLOOD PRESSURE: 88 MMHG | WEIGHT: 103 LBS | BODY MASS INDEX: 17.58 KG/M2 | TEMPERATURE: 208.76 F | HEIGHT: 64 IN | HEART RATE: 76 BPM | SYSTOLIC BLOOD PRESSURE: 144 MMHG

## 2025-07-16 PROCEDURE — 99213 OFFICE O/P EST LOW 20 MIN: CPT

## 2025-07-16 RX ORDER — CALCIUM POLYCARBOPHIL 625 MG
625 TABLET ORAL DAILY
Qty: 120 | Refills: 5 | Status: ACTIVE | COMMUNITY
Start: 2025-07-16 | End: 1900-01-01

## 2025-08-11 ENCOUNTER — APPOINTMENT (OUTPATIENT)
Dept: SURGICAL ONCOLOGY | Facility: CLINIC | Age: 60
End: 2025-08-11
Payer: COMMERCIAL

## 2025-08-11 VITALS
BODY MASS INDEX: 17.58 KG/M2 | DIASTOLIC BLOOD PRESSURE: 66 MMHG | SYSTOLIC BLOOD PRESSURE: 111 MMHG | HEIGHT: 64 IN | OXYGEN SATURATION: 98 % | WEIGHT: 103 LBS | TEMPERATURE: 206.96 F | RESPIRATION RATE: 15 BRPM | HEART RATE: 75 BPM

## 2025-08-11 DIAGNOSIS — D3A.8 OTHER BENIGN NEUROENDOCRINE TUMORS: ICD-10-CM

## 2025-08-11 DIAGNOSIS — R10.11 RIGHT UPPER QUADRANT PAIN: ICD-10-CM

## 2025-08-11 PROCEDURE — 99214 OFFICE O/P EST MOD 30 MIN: CPT

## (undated) DEVICE — DRAPE WARMING SOLUTION 66 X 44"

## (undated) DEVICE — SUT VICRYL PLUS 0 54" TIES

## (undated) DEVICE — FEEDING TUBE NG ARGYLE PVC 5FR 91CM

## (undated) DEVICE — WARMING BLANKET FULL UNDERBODY

## (undated) DEVICE — TROCAR COVIDIEN VERSAPORT BLADELESS OPTICAL 5MM STANDARD

## (undated) DEVICE — SUT VICRYL 3-0 27" SH

## (undated) DEVICE — STOPCOCK 4-WAY

## (undated) DEVICE — GLV 7 PROTEXIS (WHITE)

## (undated) DEVICE — Device

## (undated) DEVICE — ELCTR THUNDERBEAT HANDPIECE 5MM X 20CM FRONT CONTROL

## (undated) DEVICE — SUT CHROMIC 5-0 27" RB-1

## (undated) DEVICE — SUT MONOCRYL 4-0 18" PS-2

## (undated) DEVICE — STAPLER SKIN MULTI DIRECTION W35

## (undated) DEVICE — SUT VICRYL 4-0 27" RB-1 UNDYED

## (undated) DEVICE — SUT PDS II 3-0 27" SH

## (undated) DEVICE — BLADE SURGICAL #15 CARBON

## (undated) DEVICE — ELCTR GROUNDING PAD ADULT COVIDIEN

## (undated) DEVICE — FRAZIER SUCTION TIP 8FR

## (undated) DEVICE — ELCTR STRYKER NEPTUNE SMOKE EVACUATION PENCIL (GREEN)

## (undated) DEVICE — RAYTEC

## (undated) DEVICE — SUT SILK 3-0 18" SH (POP-OFF)

## (undated) DEVICE — SUT PDS II 1 27" CT-1

## (undated) DEVICE — FEEDING TUBE NG ARGYLE PVC 8FR 107CM ENFIT

## (undated) DEVICE — POSITIONER PINK PAD PIGAZZI SYSTEM XL W ARM PROTECTOR

## (undated) DEVICE — DRSG DERMABOND 0.7ML

## (undated) DEVICE — FOLEY TRAY 16FR 5CC LF UMETER CLOSED

## (undated) DEVICE — SUT VICRYL PLUS 2-0 27" SH

## (undated) DEVICE — PACK ABDOMINAL GENERAL CLOSING

## (undated) DEVICE — TIP METZENBAUM SCISSOR MONOPOLAR ENDOCUT (ORANGE)

## (undated) DEVICE — SUT VICRYL PLUS 0 36" CT-1

## (undated) DEVICE — STRYKER COLORADO N-SERIES 3CM STRAIGHT

## (undated) DEVICE — SPONGE PEANUT AUTO COUNT

## (undated) DEVICE — PACK GENERAL LAPAROSCOPY

## (undated) DEVICE — SUT VICRYL 2-0 27" CT-1 UNDYED

## (undated) DEVICE — SUCTION YANKAUER BULBOUS TIP W VENT

## (undated) DEVICE — POSITIONER FOAM EGG CRATE ULNAR 2PCS (PINK)

## (undated) DEVICE — MARKING PEN W RULER

## (undated) DEVICE — BLADE SURGICAL #10 CARBON

## (undated) DEVICE — SUT PDS II 6-0 30" RB-2

## (undated) DEVICE — SUT ETHILON 2-0 18" PS-2

## (undated) DEVICE — GLV 8 PROTEXIS (WHITE)

## (undated) DEVICE — SUT MONOCRYL PLUS 4-0 18" PS-2 UNDYED

## (undated) DEVICE — SUT PLEDGET CV FELT SQ PTFE 8 X 8MM

## (undated) DEVICE — SUT PDS II PLUS 1 96" TP-1

## (undated) DEVICE — SPECIMEN CONTAINER 4OZ

## (undated) DEVICE — DRAPE LIGHT HANDLE COVER (BLUE)

## (undated) DEVICE — PACK PERI GYN

## (undated) DEVICE — SUT PLEDGET SOFT MEDIUM 1/4" X 1/8" X 1/16" X6

## (undated) DEVICE — GLV 7.5 PROTEXIS (WHITE)

## (undated) DEVICE — VESSEL LOOP MAXI-BLUE 0.120" X 16"

## (undated) DEVICE — SUT BOOT STANDARD (ORIGINAL YELLOW) 5 PAIR

## (undated) DEVICE — DRAPE TOP SHEET 53" X 101"

## (undated) DEVICE — DRSG TELFA 3 X 8

## (undated) DEVICE — SUT VICRYL PLUS 4-0 27" SH UNDYED

## (undated) DEVICE — DRAPE TOWEL BLUE 17" X 24"

## (undated) DEVICE — SUT PDS II 4-0 27" RB-1

## (undated) DEVICE — SUT PDO 2-0 1/2 CIRCLE 26MM NDL 20CM

## (undated) DEVICE — SUT VICRYL 4-0 2" RB-1

## (undated) DEVICE — POOLE SUCTION TIP

## (undated) DEVICE — STAPLER SKIN PROXIMATE

## (undated) DEVICE — ELCTR HOLSTER ACCESSORY

## (undated) DEVICE — CLIPPER BLADE GENERAL USE

## (undated) DEVICE — TUBING STRYKER PNEUMOSURE HI FLOW INSUFFLATOR

## (undated) DEVICE — GOWN XL

## (undated) DEVICE — PACK EXPLORATORY LAPAROTOMY

## (undated) DEVICE — STAPLER COVIDIEN ENDO GIA SHORT HANDLE

## (undated) DEVICE — SUT ETHILON 2-0 18" PS

## (undated) DEVICE — WARMING BLANKET UPPER ADULT

## (undated) DEVICE — DRAIN RESERVOIR FOR JACKSON PRATT 100CC CARDINAL

## (undated) DEVICE — SUT PROLENE 4-0 36" RB-1

## (undated) DEVICE — SUT PDS II 5-0 30" C-1

## (undated) DEVICE — DRSG STERISTRIPS 0.5 X 4"

## (undated) DEVICE — SUT VICRYL PLUS 2-0 18" TIES UNDYED

## (undated) DEVICE — SUT VICRYL 0 18" TIES UNDYED

## (undated) DEVICE — LIGASURE BLUNT TIP 5MM X 37CM

## (undated) DEVICE — VENODYNE/SCD SLEEVE CALF MEDIUM

## (undated) DEVICE — D HELP - CLEARVIEW CLEARIFY SYSTEM

## (undated) DEVICE — SUT VICRYL 3-0 18" TIES UNDYED

## (undated) DEVICE — INSUFFLATION NDL COVIDIEN SURGINEEDLE VERESS 120MM

## (undated) DEVICE — SUT SILK 2-0 30" SH

## (undated) DEVICE — SUT PDS II PLUS 4-0 27" SH

## (undated) DEVICE — SYR LUER LOK 30CC

## (undated) DEVICE — POSITIONER STRAP KNEE & BODY 3X60" DISP

## (undated) DEVICE — NDL SPINAL 22G X 3.5" (BLACK)

## (undated) DEVICE — SUT VICRYL 2-0 27" CT-1